# Patient Record
Sex: FEMALE | Race: WHITE | NOT HISPANIC OR LATINO | Employment: FULL TIME | URBAN - METROPOLITAN AREA
[De-identification: names, ages, dates, MRNs, and addresses within clinical notes are randomized per-mention and may not be internally consistent; named-entity substitution may affect disease eponyms.]

---

## 2017-09-16 ENCOUNTER — OFFICE VISIT (OUTPATIENT)
Dept: URGENT CARE | Facility: PHYSICIAN GROUP | Age: 32
End: 2017-09-16
Payer: COMMERCIAL

## 2017-09-16 VITALS
BODY MASS INDEX: 27.66 KG/M2 | WEIGHT: 166 LBS | SYSTOLIC BLOOD PRESSURE: 120 MMHG | DIASTOLIC BLOOD PRESSURE: 78 MMHG | RESPIRATION RATE: 16 BRPM | HEIGHT: 65 IN | OXYGEN SATURATION: 97 % | HEART RATE: 91 BPM | TEMPERATURE: 98.6 F

## 2017-09-16 DIAGNOSIS — N64.4 BREAST TENDERNESS IN FEMALE: ICD-10-CM

## 2017-09-16 PROCEDURE — 99213 OFFICE O/P EST LOW 20 MIN: CPT | Performed by: PHYSICIAN ASSISTANT

## 2017-09-16 ASSESSMENT — ENCOUNTER SYMPTOMS
RESPIRATORY NEGATIVE: 1
NEUROLOGICAL NEGATIVE: 1
MUSCULOSKELETAL NEGATIVE: 1
CONSTITUTIONAL NEGATIVE: 1
CARDIOVASCULAR NEGATIVE: 1

## 2017-09-16 NOTE — PROGRESS NOTES
"Subjective:      Donovan Shrestha is a 31 y.o. female who presents with Breast Mass (R. side starting last night Pt. states that the lump is close towards her armpit AND IT IS PAINFUL FOR HER. )        HPI   Patient presents today for noticed painful possible lump in the right breast tissue near her armpit.  She states she went to stretch yesterday and felt a soreness as she stretched her arms and then felt around and had her mom feel around and they both thought there was a lump.  Patient states it is  today.  She states she is admittedly stressed about a worst case scenario such as breast cancer.  Her aunt and grandmother have both had breast cancer, she believes her aunt did at age 31.  No first degree relatives.   No nipple discharge.  No skin changes.  No redness.  No fevers or chills.  LMP 4 days ago.  She does smoke.     Review of Systems   Constitutional: Negative.    Respiratory: Negative.    Cardiovascular: Negative.    Musculoskeletal: Negative.    Skin: Negative.    Neurological: Negative.    Endo/Heme/Allergies: Negative.        PMH:  has no past medical history on file.  MEDS:   Current Outpatient Prescriptions:   •  ibuprofen (MOTRIN) 800 MG TABS, Take 1 Tab by mouth every 8 hours as needed., Disp: 30 Tab, Rfl: 3  ALLERGIES:   Allergies   Allergen Reactions   • Amoxicillin    • Penicillins      SURGHX:   Past Surgical History:   Procedure Laterality Date   • TONSILLECTOMY       SOCHX:  reports that she has been smoking Cigarettes.  She has been smoking about 0.25 packs per day. She has never used smokeless tobacco. She reports that she drinks alcohol. She reports that she does not use drugs.  FH: Family history was reviewed, no pertinent findings to report     Objective:     /78   Pulse 91   Temp 37 °C (98.6 °F)   Resp 16   Ht 1.651 m (5' 5\")   Wt 75.3 kg (166 lb)   SpO2 97%   BMI 27.62 kg/m²      Physical Exam   Constitutional: She is oriented to person, place, and time. She " appears well-developed and well-nourished. No distress.   Cardiovascular: Normal rate and regular rhythm.    Pulmonary/Chest: Effort normal and breath sounds normal. Right breast exhibits no inverted nipple. Breasts are symmetrical. There is no breast swelling.       Genitourinary: No breast discharge or bleeding.   Neurological: She is alert and oriented to person, place, and time.   Skin: Skin is warm and dry. No rash noted.   Psychiatric: She has a normal mood and affect. Her behavior is normal.   Vitals reviewed.          Assessment/Plan:     1. Breast tenderness in female         -exam as above.   -No discrete mass however patient is smoker, FH of breast cancer in a 2nd degree relative as young as 31.    -she will need follow up and possible imaging.  I have set her up appt for Sept 25 PCP at earliest available at this clinic as is her preference.  She declines sooner appt elsewhere.   -recommend Ibuprofen, warm compresses until then  -RTC precautions in meantime.       Supportive care, differential diagnoses, and indications for immediate follow-up discussed with patient.   Pathogenesis of diagnosis discussed including typical length and natural progression.   Instructed to return to clinic or nearest emergency department for any change in condition, further concerns, or worsening of symptoms.  Patient states understanding of the plan of care and discharge instructions.      Dianelys Mosqueda P.A.-C.

## 2017-09-25 ENCOUNTER — OFFICE VISIT (OUTPATIENT)
Dept: MEDICAL GROUP | Facility: PHYSICIAN GROUP | Age: 32
End: 2017-09-25
Payer: COMMERCIAL

## 2017-09-25 VITALS
DIASTOLIC BLOOD PRESSURE: 70 MMHG | WEIGHT: 165 LBS | OXYGEN SATURATION: 96 % | TEMPERATURE: 99.9 F | HEIGHT: 65 IN | HEART RATE: 77 BPM | SYSTOLIC BLOOD PRESSURE: 112 MMHG | BODY MASS INDEX: 27.49 KG/M2

## 2017-09-25 DIAGNOSIS — N63.10 LUMP OF RIGHT BREAST: ICD-10-CM

## 2017-09-25 PROCEDURE — 99214 OFFICE O/P EST MOD 30 MIN: CPT | Performed by: PHYSICIAN ASSISTANT

## 2017-09-25 ASSESSMENT — PATIENT HEALTH QUESTIONNAIRE - PHQ9: CLINICAL INTERPRETATION OF PHQ2 SCORE: 0

## 2017-09-25 NOTE — PROGRESS NOTES
"Subjective:   Donovan Shrestha is a 31 y.o. female here today for Right breast lump. She is a new patient to me and is also establishing care today.      Lump of right breast  Onset 2-3 weeks ago right after period. Initially thought it would go away, as had had painful breasts r/t periods in the past  Became more tender on 9/15 and went to . States they confirmed a lump and told her to f/u with PCP.  Pain becomes worse with activity.  No nipple discharge. Denies skin redness over lump.       Current medicines (including changes today)  Current Outpatient Prescriptions   Medication Sig Dispense Refill   • ibuprofen (MOTRIN) 800 MG TABS Take 1 Tab by mouth every 8 hours as needed. 30 Tab 3     No current facility-administered medications for this visit.      She  has no past medical history on file.    ROS  Constitutional ROS: Negative for fevers, night sweats, Positive for hot/cold flashes.  Breast ROS: No nipple discharge, No recent change in shape/color, Positive for family history of breast disease: MGM & PGM, lump/mass on the right, pain on the right, tenderness on the right.     Objective:     Blood pressure 112/70, pulse 77, temperature 37.7 °C (99.9 °F), height 1.651 m (5' 5\"), weight 74.8 kg (165 lb), last menstrual period 09/09/2017, SpO2 96 %, not currently breastfeeding. Body mass index is 27.46 kg/m².     Physical Exam:  Constitutional: Alert, no distress.  Skin: Warm, dry, good turgor, no rashes in visible areas.  Eye: Pupils are equal and round, conjunctiva clear, lids normal.  ENMT: Lips without lesions, moist mucus membranes.  Neck: No visible masses.   Breast: Breasts are normal-appearing bilaterally. No skin erythema or dimpling/peau d'orange appearance. Left breast: normal exam. Right breast: in the far upper outer quadrant in the tail of Gillespie is localized tenderness and mild tissue thickening. No firm, discrete mass palpated. There is no axillary or supraclavicular adenopathy " palpated.  Respiratory: Unlabored respiratory effort, lungs clear to auscultation, no wheezes, no rhonchi.  Cardiovascular: Normal S1, S2, no murmur, no lower extremity edema.      Assessment and Plan:   The following treatment plan was discussed    1. Lump of right breast  No discrete mass, but given tenderness and thickening of tissue, recommend ultrasound to further evaluate, especially given patient's family h/o early-onset breast cancer. Continue supportive measures for pain control.  - US-BREAST COMPLETE-RIGHT; Future      Followup: Return if symptoms worsen or fail to improve.    Jennifer Orlando P.A.-C.

## 2017-09-25 NOTE — ASSESSMENT & PLAN NOTE
Started noticing pain and tenderness in her right upper outer breast about 2-3 weeks ago. This was right after period ended. Initially thought it would go away, as had had painful breasts r/t periods in the past. Breast became more tender on 9/15 and one of her family members examined the area and thought they felt a lump. Patient went to urgent care for further evaluation. States they confirmed a lump and told her to f/u with PCP. Patient states pain becomes worse with activity. Ibuprofen/Tylenol are minimally helpful. Has been doing warm compresses. No nipple discharge. Denies skin redness over lump or any other skin changes.

## 2017-10-06 ENCOUNTER — OFFICE VISIT (OUTPATIENT)
Dept: MEDICAL GROUP | Facility: PHYSICIAN GROUP | Age: 32
End: 2017-10-06
Payer: COMMERCIAL

## 2017-10-06 VITALS
SYSTOLIC BLOOD PRESSURE: 108 MMHG | OXYGEN SATURATION: 93 % | DIASTOLIC BLOOD PRESSURE: 72 MMHG | WEIGHT: 165 LBS | BODY MASS INDEX: 27.49 KG/M2 | HEIGHT: 65 IN | HEART RATE: 82 BPM | TEMPERATURE: 99 F

## 2017-10-06 DIAGNOSIS — L98.9 SKIN LESION OF RIGHT ARM: ICD-10-CM

## 2017-10-06 DIAGNOSIS — Z30.09 BIRTH CONTROL COUNSELING: ICD-10-CM

## 2017-10-06 DIAGNOSIS — N63.10 LUMP OF RIGHT BREAST: ICD-10-CM

## 2017-10-06 DIAGNOSIS — Z00.00 ANNUAL PHYSICAL EXAM: ICD-10-CM

## 2017-10-06 PROCEDURE — 99999 PR NO CHARGE: CPT | Performed by: PHYSICIAN ASSISTANT

## 2017-10-06 PROCEDURE — 99395 PREV VISIT EST AGE 18-39: CPT | Performed by: PHYSICIAN ASSISTANT

## 2017-10-06 NOTE — ASSESSMENT & PLAN NOTE
Complains of purplish lesion on right upper arm. First noticed ~2.5 years ago. Went to dermatologist 2 years ago and was told nothing to worry about, but it continues to increase in size. Denies pain, itching. Color is irregular but has remained consistent.

## 2017-10-06 NOTE — PROGRESS NOTES
Chief Complaint: Annual    Donovan Shrestha is a 31 y.o. Female established patient who presents for annual exam. She would like to discuss birth control today.    Pt has GYN provider: no  Last colonoscopy: N/A  Bone density test: N\A  Last Td: unknown  Regular exercise: not asked     She has the following current medical problems:    Birth control counseling  Patient states that she would like to start on a birth control method. She has never used birth control in the past so knows nothing about the available options. She has one child.     Lump of right breast  Patient states she still notices the lump. Has upcoming appointment for breast US.    Skin lesion of right arm  On ROS questioning, patient c/o of purplish lesion on right upper arm that she's concerned about. First noticed ~2.5 years ago. Went to dermatologist 2 years ago and was told nothing to worry about, but it continues to increase in size. Denies pain, itching. Color is irregular but has remained consistent.      She  has no past medical history on file.  She  has a past surgical history that includes tonsillectomy.  Current Outpatient Prescriptions   Medication Sig Dispense Refill   • Norethin-Eth Estrad-Fe Biphas (LO LOESTRIN FE) 1 MG-10 MCG / 10 MCG Tab Take 1 Tab by mouth every day. 28 Tab 11   • ibuprofen (MOTRIN) 800 MG TABS Take 1 Tab by mouth every 8 hours as needed. 30 Tab 3     No current facility-administered medications for this visit.      Social History   Substance Use Topics   • Smoking status: Current Every Day Smoker     Packs/day: 0.25     Years: 10.00     Types: Cigarettes   • Smokeless tobacco: Never Used      Comment: currently smoking about 2 cigs/day   • Alcohol use Yes      Comment: occasional      Review Of Systems  Skin: positive for changing mole, see HPI.   Eyes: negative for visual blurring, double vision, eye pain  Ears/Nose/Throat: negative for oral or dental problem - sees dentist routinely  Respiratory: negative for  "dyspnea  Cardiovascular: negative for chest pain or pressure   Breast: positive for breast mass, see HPI.  Gastrointestinal: negative for nausea, abdominal pain, constipation or diarrhea, black stool or bloody stool  Genitourinary: negative for dysuria, frequency, hematuria  Hematologic/Lymphatic/Immunologic: negative for HIV risk factors  Endocrine: negative for polyuria.       PHYSICAL EXAMINATION:  Blood pressure 108/72, pulse 82, temperature 37.2 °C (99 °F), height 1.651 m (5' 5\"), weight 74.8 kg (165 lb), last menstrual period 10/06/2017, SpO2 93 %.  Body mass index is 27.46 kg/m².  Wt Readings from Last 4 Encounters:   10/06/17 74.8 kg (165 lb)   09/25/17 74.8 kg (165 lb)   09/16/17 75.3 kg (166 lb)   06/26/16 68 kg (150 lb)       Constitutional: Alert, no distress.  Skin: Warm, dry, good turgor, no rashes. There is an ~3 mm round, raised papular lesion on the right upper arm. Regular borders but irregularly-colored (purple-gray and flesh-colored).   Eye: pupils equal, round and reactive to light, conjunctivae clear, lids normal.  ENMT: Lips without lesions, good dentition, oropharynx clear. Pinnae skin normal with no lesions. TM pearly gray with normal light reflex.   Neck: supple, no masses. No submandibular, anterior cervical, or supraclavicular adenopathy. No thyromegaly.  Respiratory: Unlabored respiratory effort, lungs clear to auscultation, no wheezes, no ronchi.  Cardiovascular: Normal S1, S2, no murmur, no peripheral edema.  Abdomen: Normoactive bowel sounds. Abdomen Soft, non-tender, no masses, no hepatosplenomegaly. No CVAT.  Psych: Alert and oriented x3, normal affect and mood.  Musc/Skel: Normal motion UE and LE proximal and distal.        ASSESSMENT/PLAN:    1. Annual physical exam  HCM:  Pap deferred today given that patient just started her menstrual period. She will re-schedule. Discussed importance of quitting smoking.   Pt counseled about skin care.    2. Birth control counseling  Patient " counseled on all of the available birth control options and most common side effects associated with each. She opts for the OCP. Patient was warned about increased risk of blood clots given that she is still smoking, albeit not very much. She understands these risks and states she is planning to quit completely in the near future. Will prescribe OCP with low estrogen component.   - Norethin-Eth Estrad-Fe Biphas (LO LOESTRIN FE) 1 MG-10 MCG / 10 MCG Tab; Take 1 Tab by mouth every day.  Dispense: 28 Tab; Refill: 11    3. Skin lesion of right arm  Given increasing size and irregular coloration, recommend biopsy to rule out malignancy. Patient advised to schedule separate appointment for this.    4. Lump of right breast  Should f/u for breast imaging as scheduled.      Jennifer Orlando P.A.-C.

## 2017-10-06 NOTE — PATIENT INSTRUCTIONS
-Schedule appointment for pap smear  -Schedule appointment for skin biopsy     Ethinyl Estradiol; Norethindrone Acetate tablets (contraception)  What is this medicine?  ETHINYL ESTRADIOL; NORETHINDRONE ACETATE (ETH in il es tra DYE ole; nor eth IN drone AS e jonas) is an oral contraceptive. The products combine two types of female hormones, an estrogen and a progestin. They are used to prevent ovulation and pregnancy. Some products are also used to treat acne in females.  This medicine may be used for other purposes; ask your health care provider or pharmacist if you have questions.  COMMON BRAND NAME(S): Estrostep Fe, Gildess Fe 1.5/30, Gildess Fe 1/20, Gildess, Junel 1.5/30, Junel 1/20, Junel Fe 1.5/30, Junel Fe 1/20, Robel Fe, ROBEL, Lo Loestrin Fe, Loestrin 1.5/30, Loestrin 1/20, Loestrin 24 Fe, Loestrin FE 1.5/30, Loestrin FE 1/20, Lomedia 24 Fe, Microgestin 1.5/30, Microgestin 1/20, Microgestin Fe 1.5/30, Microgestin Fe 1/20, Tilia Fe, Tri-Legest Fe  What should I tell my health care provider before I take this medicine?  They need to know if you have or ever had any of these conditions:  -abnormal vaginal bleeding  -blood vessel disease or blood clots  -breast, cervical, endometrial, ovarian, liver, or uterine cancer  -diabetes  -gallbladder disease  -heart disease or recent heart attack  -high blood pressure  -high cholesterol  -kidney disease  -liver disease  -migraine headaches  -stroke  -systemic lupus erythematosus (SLE)  -tobacco smoker  -an unusual or allergic reaction to estrogens, progestins, other medicines, foods, dyes, or preservatives  -pregnant or trying to get pregnant  -breast-feeding  How should I use this medicine?  Take this medicine by mouth. To reduce nausea, this medicine may be taken with food. Follow the directions on the prescription label. Take this medicine at the same time each day and in the order directed on the package. Do not take your medicine more often than directed.  Contact  your pediatrician regarding the use of this medicine in children. Special care may be needed. This medicine has been used in female children who have started having menstrual periods.  A patient package insert for the product will be given with each prescription and refill. Read this sheet carefully each time. The sheet may change frequently.  Overdosage: If you think you have taken too much of this medicine contact a poison control center or emergency room at once.  NOTE: This medicine is only for you. Do not share this medicine with others.  What if I miss a dose?  If you miss a dose, refer to the patient information sheet you received with your medicine for direction. If you miss more than one pill, this medicine may not be as effective and you may need to use another form of birth control.  What may interact with this medicine?  -acetaminophen  -antibiotics or medicines for infections, especially rifampin, rifabutin, rifapentine, and griseofulvin, and possibly penicillins or tetracyclines  -aprepitant  -ascorbic acid (vitamin C)  -atorvastatin  -barbiturate medicines, such as phenobarbital  -bosentan  -carbamazepine  -caffeine  -clofibrate  -cyclosporine  -dantrolene  -doxercalciferol  -felbamate  -grapefruit juice  -hydrocortisone  -medicines for anxiety or sleeping problems, such as diazepam or temazepam  -medicines for diabetes, including pioglitazone  -mineral oil  -modafinil  -mycophenolate  -nefazodone  -oxcarbazepine  -phenytoin  -prednisolone  -ritonavir or other medicines for HIV infection or AIDS  -rosuvastatin  -selegiline  -soy isoflavones supplements  -Kellee's wort  -tamoxifen or raloxifene  -theophylline  -thyroid hormones  -topiramate  -warfarin  This list may not describe all possible interactions. Give your health care provider a list of all the medicines, herbs, non-prescription drugs, or dietary supplements you use. Also tell them if you smoke, drink alcohol, or use illegal drugs. Some  items may interact with your medicine.  What should I watch for while using this medicine?  Visit your doctor or health care professional for regular checks on your progress. You will need a regular breast and pelvic exam and Pap smear while on this medicine.  Use an additional method of contraception during the first cycle that you take these tablets.  If you have any reason to think you are pregnant, stop taking this medicine right away and contact your doctor or health care professional.  If you are taking this medicine for hormone related problems, it may take several cycles of use to see improvement in your condition.  Smoking increases the risk of getting a blood clot or having a stroke while you are taking birth control pills, especially if you are more than 35 years old. You are strongly advised not to smoke.  This medicine can make your body retain fluid, making your fingers, hands, or ankles swell. Your blood pressure can go up. Contact your doctor or health care professional if you feel you are retaining fluid.  This medicine can make you more sensitive to the sun. Keep out of the sun. If you cannot avoid being in the sun, wear protective clothing and use sunscreen. Do not use sun lamps or tanning beds/booths.  If you wear contact lenses and notice visual changes, or if the lenses begin to feel uncomfortable, consult your eye care specialist.  In some women, tenderness, swelling, or minor bleeding of the gums may occur. Notify your dentist if this happens. Brushing and flossing your teeth regularly may help limit this. See your dentist regularly and inform your dentist of the medicines you are taking.  If you are going to have elective surgery, you may need to stop taking this medicine before the surgery. Consult your health care professional for advice.  This medicine does not protect you against HIV infection (AIDS) or any other sexually transmitted diseases.  What side effects may I notice from  receiving this medicine?  Side effects that you should report to your doctor or health care professional as soon as possible:  -breast tissue changes or discharge  -changes in vaginal bleeding during your period or between your periods  -chest pain  -coughing up blood  -dizziness or fainting spells  -headaches or migraines  -leg, arm or groin pain  -severe or sudden headaches  -stomach pain (severe)  -sudden shortness of breath  -sudden loss of coordination, especially on one side of the body  -speech problems  -symptoms of vaginal infection like itching, irritation or unusual discharge  -tenderness in the upper abdomen  -vomiting  -weakness or numbness in the arms or legs, especially on one side of the body  -yellowing of the eyes or skin  Side effects that usually do not require medical attention (report to your doctor or health care professional if they continue or are bothersome):  -breakthrough bleeding and spotting that continues beyond the 3 initial cycles of pills  -breast tenderness  -mood changes, anxiety, depression, frustration, anger, or emotional outbursts  -increased sensitivity to sun or ultraviolet light  -nausea  -skin rash, acne, or brown spots on the skin  -weight gain (slight)  This list may not describe all possible side effects. Call your doctor for medical advice about side effects. You may report side effects to FDA at 9-053-FDA-6143.  Where should I keep my medicine?  Keep out of the reach of children.  Store at room temperature between 15 and 30 degrees C (59 and 86 degrees F). Throw away any unused medicine after the expiration date.  NOTE: This sheet is a summary. It may not cover all possible information. If you have questions about this medicine, talk to your doctor, pharmacist, or health care provider.  © 2014, [employer]. (12/3/2009 1:37:14 PM)

## 2017-10-06 NOTE — ASSESSMENT & PLAN NOTE
Patient states that she would like to start on a birth control method. She has never used birth control in the past so knows nothing about the available options. She has one child.

## 2017-10-16 ENCOUNTER — HOSPITAL ENCOUNTER (OUTPATIENT)
Dept: RADIOLOGY | Facility: MEDICAL CENTER | Age: 32
End: 2017-10-16
Attending: PHYSICIAN ASSISTANT
Payer: COMMERCIAL

## 2017-10-16 DIAGNOSIS — N63.10 LUMP OF RIGHT BREAST: ICD-10-CM

## 2017-10-16 DIAGNOSIS — N63.0 BREAST LUMP: ICD-10-CM

## 2017-10-16 PROCEDURE — 76642 ULTRASOUND BREAST LIMITED: CPT | Mod: RT

## 2017-10-17 ENCOUNTER — HOSPITAL ENCOUNTER (OUTPATIENT)
Facility: MEDICAL CENTER | Age: 32
End: 2017-10-17
Attending: PHYSICIAN ASSISTANT
Payer: COMMERCIAL

## 2017-10-17 ENCOUNTER — OFFICE VISIT (OUTPATIENT)
Dept: MEDICAL GROUP | Facility: PHYSICIAN GROUP | Age: 32
End: 2017-10-17
Payer: COMMERCIAL

## 2017-10-17 VITALS
WEIGHT: 165 LBS | BODY MASS INDEX: 27.49 KG/M2 | SYSTOLIC BLOOD PRESSURE: 102 MMHG | OXYGEN SATURATION: 92 % | HEIGHT: 65 IN | HEART RATE: 76 BPM | DIASTOLIC BLOOD PRESSURE: 78 MMHG | TEMPERATURE: 97.2 F

## 2017-10-17 DIAGNOSIS — D48.5 NEOPLASM OF UNCERTAIN BEHAVIOR OF SKIN OF UPPER ARM: ICD-10-CM

## 2017-10-17 PROCEDURE — 88305 TISSUE EXAM BY PATHOLOGIST: CPT

## 2017-10-17 PROCEDURE — 11100 PR BIOPSY OF SKIN LESION: CPT | Performed by: PHYSICIAN ASSISTANT

## 2017-10-17 NOTE — PROGRESS NOTES
PROCEDURE NOTE, Shave Biopsy  Indication: Enlarging skin lesion right upper arm      History of allergy to iodine: no     The risks (including bleeding and infection) and benefits of the procedure and written informed consent obtained.    Local anesthesia was performed with    Lidocaine 2% without epinephrine, prepped with povidone iodine, and draped in the usual sterile fashion.  Shave biopsy was obtained using a Dermablade.    The site was cauterized with a Silver Nitrate stick.  The specimen was sent for pathologic examination.     The patient tolerated the procedure well and without apparent complications.     The patient was instructed to keep the wound dry and covered until bleeding subsides. Warning signs of infection were reviewed.    Jennifer Orlando P.A.-C.

## 2017-10-30 ENCOUNTER — HOSPITAL ENCOUNTER (OUTPATIENT)
Facility: MEDICAL CENTER | Age: 32
End: 2017-10-30
Attending: PHYSICIAN ASSISTANT
Payer: COMMERCIAL

## 2017-10-30 ENCOUNTER — OFFICE VISIT (OUTPATIENT)
Dept: MEDICAL GROUP | Facility: PHYSICIAN GROUP | Age: 32
End: 2017-10-30
Payer: COMMERCIAL

## 2017-10-30 VITALS
BODY MASS INDEX: 27.49 KG/M2 | WEIGHT: 165 LBS | OXYGEN SATURATION: 95 % | SYSTOLIC BLOOD PRESSURE: 112 MMHG | DIASTOLIC BLOOD PRESSURE: 70 MMHG | HEART RATE: 70 BPM | HEIGHT: 65 IN | TEMPERATURE: 98.8 F

## 2017-10-30 DIAGNOSIS — Z01.419 WELL WOMAN EXAM WITH ROUTINE GYNECOLOGICAL EXAM: ICD-10-CM

## 2017-10-30 DIAGNOSIS — Z78.9 USES BIRTH CONTROL: ICD-10-CM

## 2017-10-30 PROBLEM — N63.10 LUMP OF RIGHT BREAST: Status: RESOLVED | Noted: 2017-09-25 | Resolved: 2017-10-30

## 2017-10-30 PROBLEM — L98.9 SKIN LESION OF RIGHT ARM: Status: RESOLVED | Noted: 2017-10-06 | Resolved: 2017-10-30

## 2017-10-30 PROCEDURE — 87591 N.GONORRHOEAE DNA AMP PROB: CPT

## 2017-10-30 PROCEDURE — 99395 PREV VISIT EST AGE 18-39: CPT | Performed by: PHYSICIAN ASSISTANT

## 2017-10-30 PROCEDURE — 88175 CYTOPATH C/V AUTO FLUID REDO: CPT

## 2017-10-30 PROCEDURE — 87491 CHLMYD TRACH DNA AMP PROBE: CPT

## 2017-10-30 NOTE — PROGRESS NOTES
"Chief Complaint: Pap smear    Donovan Shrestha is a 31 y.o. female who presents for annual gynecologic exam/pap smear    Pt has GYN provider: no  Last pap smear: Believes ~2 years ago - unsure of exact date  Denies history of previous abnormal pap  LMP: 10/6/17    Current Problems:    Uses birth control  Continues to take Lo-Loestrin Fe. Denies concerns.    She  has no past medical history on file.  She  has a past surgical history that includes tonsillectomy.  Current Outpatient Prescriptions   Medication Sig Dispense Refill   • Norethin-Eth Estrad-Fe Biphas (LO LOESTRIN FE) 1 MG-10 MCG / 10 MCG Tab Take 1 Tab by mouth every day. 28 Tab 11   • ibuprofen (MOTRIN) 800 MG TABS Take 1 Tab by mouth every 8 hours as needed. 30 Tab 3     No current facility-administered medications for this visit.      Social History   Substance Use Topics   • Smoking status: Current Every Day Smoker     Packs/day: 0.25     Years: 10.00     Types: Cigarettes   • Smokeless tobacco: Never Used      Comment: currently smoking about 2 cigs/day   • Alcohol use Yes      Comment: occasional        Review Of Systems  Breast: Denies breast tenderness, mass. States her previous breast symptoms have completely resolved.  Genitourinary: negative for abnormal vaginal discharge, pelvic pain, abnormal vaginal bleeding        PHYSICAL EXAMINATION:  Blood pressure 112/70, pulse 70, temperature 37.1 °C (98.8 °F), height 1.651 m (5' 5\"), weight 74.8 kg (165 lb), last menstrual period 10/06/2017, SpO2 95 %, not currently breastfeeding.  Body mass index is 27.46 kg/m².  Wt Readings from Last 4 Encounters:   10/30/17 74.8 kg (165 lb)   10/17/17 74.8 kg (165 lb)   10/06/17 74.8 kg (165 lb)   09/25/17 74.8 kg (165 lb)       Constitutional: Alert, no distress.  Skin: Warm, dry, good turgor, no rashes or suspicious lesions in visible areas.  Eye: pupils equal, round, conjunctivae clear, lids normal.  ENMT: Lips without lesions.  Breast: Deferred - performed " 9/2017  Pelvic: Normal-appearing external female genitalia. No rash, erythema, edema, or lesions appreciated. On speculum insertion, vaginal walls have normal, rugated appearance. No abnormal discharge is seen. Cervix is easily visualized. Os is multiparous. Some minor friability appreciated with pap collection.      ASSESSMENT/PLAN:    1. Well woman exam with routine gynecological exam  - THINPREP RFLX HPV ASCUS W/CTNG; Future    2. Uses birth control  Continue Lo Loestrin Fe.      Return in about 1 year (around 10/30/2018) for Annual; Short.    Jennifer Orlando P.A.-C.

## 2017-11-02 LAB
C TRACH DNA GENITAL QL NAA+PROBE: NEGATIVE
CYTOLOGY REG CYTOL: NORMAL
N GONORRHOEA DNA GENITAL QL NAA+PROBE: NEGATIVE
SPECIMEN SOURCE: NORMAL

## 2018-06-20 ENCOUNTER — OFFICE VISIT (OUTPATIENT)
Dept: URGENT CARE | Facility: PHYSICIAN GROUP | Age: 33
End: 2018-06-20
Payer: COMMERCIAL

## 2018-06-20 VITALS
BODY MASS INDEX: 29.76 KG/M2 | WEIGHT: 178.6 LBS | OXYGEN SATURATION: 96 % | HEIGHT: 65 IN | DIASTOLIC BLOOD PRESSURE: 80 MMHG | SYSTOLIC BLOOD PRESSURE: 110 MMHG | TEMPERATURE: 98.8 F | HEART RATE: 74 BPM

## 2018-06-20 DIAGNOSIS — H00.021 HORDEOLUM INTERNUM OF RIGHT UPPER EYELID: ICD-10-CM

## 2018-06-20 PROCEDURE — 99214 OFFICE O/P EST MOD 30 MIN: CPT | Performed by: PHYSICIAN ASSISTANT

## 2018-06-20 RX ORDER — POLYMYXIN B SULFATE AND TRIMETHOPRIM 1; 10000 MG/ML; [USP'U]/ML
1 SOLUTION OPHTHALMIC EVERY 4 HOURS
Qty: 10 ML | Refills: 0 | Status: SHIPPED | OUTPATIENT
Start: 2018-06-20 | End: 2018-06-27

## 2018-06-20 ASSESSMENT — ENCOUNTER SYMPTOMS
DOUBLE VISION: 0
BLURRED VISION: 0
FOREIGN BODY SENSATION: 0
EYE REDNESS: 0
PHOTOPHOBIA: 0
EYE DISCHARGE: 1

## 2018-06-20 NOTE — PROGRESS NOTES
"Subjective:      Donovan Shrestha is a 32 y.o. female who presents with Eye Problem (R eye swollen, pt woke up with swollen/shut eye, sore, x2 days )    PMH:  Reviewed with patient/family member/EPIC.   MEDS:   Current Outpatient Prescriptions:   •  Norethin-Eth Estrad-Fe Biphas (LO LOESTRIN FE) 1 MG-10 MCG / 10 MCG Tab, Take 1 Tab by mouth every day., Disp: 28 Tab, Rfl: 11  •  ibuprofen (MOTRIN) 800 MG TABS, Take 1 Tab by mouth every 8 hours as needed., Disp: 30 Tab, Rfl: 3  ALLERGIES:   Allergies   Allergen Reactions   • Amoxicillin    • Penicillins      SURGHX:   Past Surgical History:   Procedure Laterality Date   • TONSILLECTOMY       SOCHX:  reports that she has been smoking Cigarettes.  She has a 2.50 pack-year smoking history. She has never used smokeless tobacco. She reports that she drinks alcohol. She reports that she does not use drugs.  FH: Reviewed with patient/family. Not pertinent to this complaint.          Patient presents with:  Eye Problem: R eye swollen, pt woke up with swollen/shut eye, sore, x2 days , upper eyelid feels sore on lateral corner          Eye Problem    The right eye is affected. This is a new problem. The current episode started yesterday. The problem occurs constantly. The problem has been gradually worsening. There was no injury mechanism. The pain is at a severity of 4/10. There is no known exposure to pink eye. She does not wear contacts. Associated symptoms include an eye discharge. Pertinent negatives include no blurred vision, double vision, eye redness, foreign body sensation or photophobia. Treatments tried: warm compress. The treatment provided no relief.       Review of Systems   Eyes: Positive for discharge. Negative for blurred vision, double vision, photophobia and redness.   All other systems reviewed and are negative.         Objective:     /80   Pulse 74   Temp 37.1 °C (98.8 °F)   Ht 1.651 m (5' 5\")   Wt 81 kg (178 lb 9.6 oz)   SpO2 96%   BMI 29.72 kg/m²  "     Physical Exam   Constitutional: She is oriented to person, place, and time. She appears well-developed and well-nourished. No distress.   HENT:   Head: Normocephalic and atraumatic.   Nose: Nose normal.   Eyes: EOM are normal. Pupils are equal, round, and reactive to light. Lids are everted and swept, no foreign bodies found. Right eye exhibits discharge and hordeolum.       Neck: Normal range of motion. Neck supple.   Cardiovascular: Normal rate, regular rhythm and normal heart sounds.    Pulmonary/Chest: Effort normal and breath sounds normal.   Musculoskeletal: Normal range of motion.   Lymphadenopathy:     She has no cervical adenopathy.   Neurological: She is alert and oriented to person, place, and time.   Skin: Skin is warm and dry.   Psychiatric: She has a normal mood and affect.   Nursing note and vitals reviewed.              Assessment/Plan:     1. Hordeolum internum of right upper eyelid  polymixin-trimethoprim (POLYTRIM) 15930-7.1 UNIT/ML-% Solution     PT can use warm compress on affected area for relief of symptoms.     Motrin/Advil/Ibuprophen 600 mg every 6 hours as needed for pain or fever.    PT should follow up with PCP in 1-2 days for re-evaluation if symptoms have not improved.  Discussed red flags and reasons to return to UC or ED.  Pt and/or family verbalized understanding of diagnosis and follow up instructions and was offered informational handout on diagnosis.  PT discharged.

## 2018-06-20 NOTE — LETTER
June 20, 2018         Patient: Donovan Shrestha   YOB: 1985   Date of Visit: 6/20/2018           To Whom it May Concern:    Donovan Shrestha was seen in my clinic on 6/20/2018. She may return to work today without restrictions.    If you have any questions or concerns, please don't hesitate to call.        Sincerely,           Brigid Fonseca P.A.-C.  Electronically Signed

## 2018-06-20 NOTE — PATIENT INSTRUCTIONS
Stye  A stye is a bump on your eyelid caused by a bacterial infection. A stye can form inside the eyelid (internal stye) or outside the eyelid (external stye). An internal stye may be caused by an infected oil-producing gland inside your eyelid. An external stye may be caused by an infection at the base of your eyelash (hair follicle).  Styes are very common. Anyone can get them at any age. They usually occur in just one eye, but you may have more than one in either eye.  What are the causes?  The infection is almost always caused by bacteria called Staphylococcus aureus. This is a common type of bacteria that lives on your skin.  What increases the risk?  You may be at higher risk for a stye if you have had one before. You may also be at higher risk if you have:  · Diabetes.  · Long-term illness.  · Long-term eye redness.  · A skin condition called seborrhea.  · High fat levels in your blood (lipids).  What are the signs or symptoms?  Eyelid pain is the most common symptom of a stye. Internal styes are more painful than external styes. Other signs and symptoms may include:  · Painful swelling of your eyelid.  · A scratchy feeling in your eye.  · Tearing and redness of your eye.  · Pus draining from the stye.  How is this diagnosed?  Your health care provider may be able to diagnose a stye just by examining your eye. The health care provider may also check to make sure:  · You do not have a fever or other signs of a more serious infection.  · The infection has not spread to other parts of your eye or areas around your eye.  How is this treated?  Most styes will clear up in a few days without treatment. In some cases, you may need to use antibiotic drops or ointment to prevent infection. Your health care provider may have to drain the stye surgically if your stye is:  · Large.  · Causing a lot of pain.  · Interfering with your vision.  This can be done using a thin blade or a needle.  Follow these instructions at  home:  · Take medicines only as directed by your health care provider.  · Apply a clean, warm compress to your eye for 10 minutes, 4 times a day.  · Do not wear contact lenses or eye makeup until your stye has healed.  · Do not try to pop or drain the stye.  Contact a health care provider if:  · You have chills or a fever.  · Your stye does not go away after several days.  · Your stye affects your vision.  · Your eyeball becomes swollen, red, or painful.  This information is not intended to replace advice given to you by your health care provider. Make sure you discuss any questions you have with your health care provider.  Document Released: 09/27/2006 Document Revised: 08/13/2017 Document Reviewed: 04/03/2015  Elsevier Interactive Patient Education © 2017 [employer]

## 2018-10-18 ENCOUNTER — HOSPITAL ENCOUNTER (EMERGENCY)
Facility: MEDICAL CENTER | Age: 33
End: 2018-10-18
Attending: EMERGENCY MEDICINE
Payer: COMMERCIAL

## 2018-10-18 ENCOUNTER — APPOINTMENT (OUTPATIENT)
Dept: RADIOLOGY | Facility: MEDICAL CENTER | Age: 33
End: 2018-10-18
Attending: EMERGENCY MEDICINE
Payer: COMMERCIAL

## 2018-10-18 VITALS
BODY MASS INDEX: 29.38 KG/M2 | SYSTOLIC BLOOD PRESSURE: 121 MMHG | RESPIRATION RATE: 20 BRPM | DIASTOLIC BLOOD PRESSURE: 79 MMHG | TEMPERATURE: 97.6 F | WEIGHT: 176.37 LBS | HEART RATE: 67 BPM | OXYGEN SATURATION: 97 % | HEIGHT: 65 IN

## 2018-10-18 DIAGNOSIS — R07.89 CHEST WALL PAIN: ICD-10-CM

## 2018-10-18 LAB
ALBUMIN SERPL BCP-MCNC: 4.3 G/DL (ref 3.2–4.9)
ALBUMIN/GLOB SERPL: 1.6 G/DL
ALP SERPL-CCNC: 68 U/L (ref 30–99)
ALT SERPL-CCNC: 18 U/L (ref 2–50)
ANION GAP SERPL CALC-SCNC: 8 MMOL/L (ref 0–11.9)
APTT PPP: 25.1 SEC (ref 24.7–36)
AST SERPL-CCNC: 17 U/L (ref 12–45)
BASOPHILS # BLD AUTO: 0.8 % (ref 0–1.8)
BASOPHILS # BLD: 0.09 K/UL (ref 0–0.12)
BILIRUB SERPL-MCNC: 0.3 MG/DL (ref 0.1–1.5)
BNP SERPL-MCNC: 77 PG/ML (ref 0–100)
BUN SERPL-MCNC: 16 MG/DL (ref 8–22)
CALCIUM SERPL-MCNC: 8.6 MG/DL (ref 8.4–10.2)
CHLORIDE SERPL-SCNC: 105 MMOL/L (ref 96–112)
CO2 SERPL-SCNC: 21 MMOL/L (ref 20–33)
CREAT SERPL-MCNC: 0.72 MG/DL (ref 0.5–1.4)
EKG IMPRESSION: NORMAL
EOSINOPHIL # BLD AUTO: 0.58 K/UL (ref 0–0.51)
EOSINOPHIL NFR BLD: 5.3 % (ref 0–6.9)
ERYTHROCYTE [DISTWIDTH] IN BLOOD BY AUTOMATED COUNT: 40.1 FL (ref 35.9–50)
GLOBULIN SER CALC-MCNC: 2.7 G/DL (ref 1.9–3.5)
GLUCOSE SERPL-MCNC: 108 MG/DL (ref 65–99)
HCT VFR BLD AUTO: 40.5 % (ref 37–47)
HGB BLD-MCNC: 14.3 G/DL (ref 12–16)
IMM GRANULOCYTES # BLD AUTO: 0.05 K/UL (ref 0–0.11)
IMM GRANULOCYTES NFR BLD AUTO: 0.5 % (ref 0–0.9)
INR PPP: 0.97 (ref 0.87–1.13)
LIPASE SERPL-CCNC: 25 U/L (ref 7–58)
LYMPHOCYTES # BLD AUTO: 3.29 K/UL (ref 1–4.8)
LYMPHOCYTES NFR BLD: 29.9 % (ref 22–41)
MCH RBC QN AUTO: 31.8 PG (ref 27–33)
MCHC RBC AUTO-ENTMCNC: 35.3 G/DL (ref 33.6–35)
MCV RBC AUTO: 90 FL (ref 81.4–97.8)
MONOCYTES # BLD AUTO: 0.84 K/UL (ref 0–0.85)
MONOCYTES NFR BLD AUTO: 7.6 % (ref 0–13.4)
NEUTROPHILS # BLD AUTO: 6.17 K/UL (ref 2–7.15)
NEUTROPHILS NFR BLD: 55.9 % (ref 44–72)
NRBC # BLD AUTO: 0 K/UL
NRBC BLD-RTO: 0 /100 WBC
PLATELET # BLD AUTO: 280 K/UL (ref 164–446)
PMV BLD AUTO: 9.3 FL (ref 9–12.9)
POTASSIUM SERPL-SCNC: 3.7 MMOL/L (ref 3.6–5.5)
PROT SERPL-MCNC: 7 G/DL (ref 6–8.2)
PROTHROMBIN TIME: 12.8 SEC (ref 12–14.6)
RBC # BLD AUTO: 4.5 M/UL (ref 4.2–5.4)
SODIUM SERPL-SCNC: 134 MMOL/L (ref 135–145)
TROPONIN I SERPL-MCNC: <0.02 NG/ML (ref 0–0.04)
WBC # BLD AUTO: 11 K/UL (ref 4.8–10.8)

## 2018-10-18 PROCEDURE — 80053 COMPREHEN METABOLIC PANEL: CPT

## 2018-10-18 PROCEDURE — 83690 ASSAY OF LIPASE: CPT

## 2018-10-18 PROCEDURE — 700111 HCHG RX REV CODE 636 W/ 250 OVERRIDE (IP): Performed by: EMERGENCY MEDICINE

## 2018-10-18 PROCEDURE — 93005 ELECTROCARDIOGRAM TRACING: CPT | Performed by: EMERGENCY MEDICINE

## 2018-10-18 PROCEDURE — 93005 ELECTROCARDIOGRAM TRACING: CPT

## 2018-10-18 PROCEDURE — 85730 THROMBOPLASTIN TIME PARTIAL: CPT

## 2018-10-18 PROCEDURE — 83880 ASSAY OF NATRIURETIC PEPTIDE: CPT

## 2018-10-18 PROCEDURE — 71045 X-RAY EXAM CHEST 1 VIEW: CPT

## 2018-10-18 PROCEDURE — 96374 THER/PROPH/DIAG INJ IV PUSH: CPT

## 2018-10-18 PROCEDURE — 85610 PROTHROMBIN TIME: CPT

## 2018-10-18 PROCEDURE — 99284 EMERGENCY DEPT VISIT MOD MDM: CPT

## 2018-10-18 PROCEDURE — 85025 COMPLETE CBC W/AUTO DIFF WBC: CPT

## 2018-10-18 PROCEDURE — 84484 ASSAY OF TROPONIN QUANT: CPT

## 2018-10-18 RX ORDER — KETOROLAC TROMETHAMINE 30 MG/ML
15 INJECTION, SOLUTION INTRAMUSCULAR; INTRAVENOUS ONCE
Status: COMPLETED | OUTPATIENT
Start: 2018-10-18 | End: 2018-10-18

## 2018-10-18 RX ORDER — NAPROXEN 500 MG/1
500 TABLET ORAL
Qty: 20 TAB | Refills: 0 | Status: SHIPPED | OUTPATIENT
Start: 2018-10-18 | End: 2018-10-23

## 2018-10-18 RX ADMIN — KETOROLAC TROMETHAMINE 15 MG: 30 INJECTION, SOLUTION INTRAMUSCULAR at 13:51

## 2018-10-18 ASSESSMENT — PAIN SCALES - GENERAL: PAINLEVEL_OUTOF10: 3

## 2018-10-18 ASSESSMENT — PAIN DESCRIPTION - DESCRIPTORS: DESCRIPTORS: ACHING

## 2018-10-18 NOTE — ED NOTES
All orders completed, blood has been sent and CXR, pt is resting with her eyes closed. Report to Karen who will now assume care. New medications for pain ordered.

## 2018-10-18 NOTE — ED TRIAGE NOTES
Pt presents complaining of acute onset of suprasternal chest pain since this AM, with associated dyspnea.  She denies any similar past episodes.

## 2018-10-18 NOTE — ED PROVIDER NOTES
ED Provider Note    ER PROVIDER NOTE        CHIEF COMPLAINT  Chief Complaint   Patient presents with   • Chest Pain       HPI  Donovan Shrestha is a 32 y.o. female who presents to the emergency department complaining of anterior chest pain and congestion.  Patient reports that she had slight cough/congestion, woke up this morning with some ear pain and then developed a rough anterior chest pain.  It seems to be worse when she coughs or takes a deep breath, it is not exertional or positional.  There is no radiation and has been fairly constant.  She denies any shortness of breath fevers or chills.  No leg pain or swelling.  No nausea vomiting abdominal pain or diaphoresis.    Has no history of diabetes, hypertension,drug use, dyslipidemia, family history of early CAD, history of CAD.    Does smoke cigarettes      REVIEW OF SYSTEMS  Pertinent positives include chest pain. Pertinent negatives include no fever. See HPI for details. All other systems reviewed and are negative.    PAST MEDICAL HISTORY   none    SURGICAL HISTORY   has a past surgical history that includes tonsillectomy.    FAMILY HISTORY  Family History   Problem Relation Age of Onset   • Cancer Father    • Prostate cancer Father    • Hypertension Brother    • Hypertension Paternal Aunt    • Hypertension Paternal Uncle    • Cancer Maternal Grandmother    • Breast Cancer Maternal Grandmother    • Cancer Maternal Grandfather         colon cancer   • Cancer Paternal Grandmother         breast cancer   • Hypertension Paternal Grandmother    • Breast Cancer Paternal Grandmother    • Hypertension Paternal Grandfather        SOCIAL HISTORY  Social History     Social History   • Marital status: Single     Spouse name: N/A   • Number of children: N/A   • Years of education: N/A     Social History Main Topics   • Smoking status: Current Every Day Smoker     Packs/day: 0.25     Years: 10.00     Types: Cigarettes   • Smokeless tobacco: Never Used      Comment: currently  "smoking about 2 cigs/day   • Alcohol use Yes      Comment: occasional    • Drug use: No   • Sexual activity: Yes     Partners: Male     Other Topics Concern   • Not on file     Social History Narrative   • No narrative on file      History   Drug Use No       CURRENT MEDICATIONS  Home Medications    **Home medications have not yet been reviewed for this encounter**         ALLERGIES  Allergies   Allergen Reactions   • Amoxicillin Unspecified     Rxn - seizure   • Penicillins Unspecified     Rxn - seizure       PHYSICAL EXAM  VITAL SIGNS: /79   Pulse 62   Temp 36.4 °C (97.6 °F)   Resp 18   Ht 1.651 m (5' 5\")   Wt 80 kg (176 lb 5.9 oz)   LMP 09/27/2018 (Approximate)   SpO2 96%   BMI 29.35 kg/m²   Pulse ox interpretation: I interpret this pulse ox as normal.    Constitutional: Alert in no apparent distress.  HENT: No signs of trauma, Bilateral external ears normal, Nose normal.   Eyes: Pupils are equal and reactive, Conjunctiva normal, Non-icteric.   Neck: Normal range of motion, No tenderness, Supple, No stridor.   Lymphatic: No lymphadenopathy noted.   Cardiovascular: Regular rate and rhythm, no murmurs.   Thorax & Lungs: Normal breath sounds, No respiratory distress, No wheezing, tenderness to palpation over anterior chest   Abdomen: Bowel sounds normal, Soft, No tenderness, No masses, No pulsatile masses. No peritoneal signs.  Skin: Warm, Dry, No erythema, No rash.   Back: No bony tenderness, No CVA tenderness.   Extremities: Intact distal pulses, No edema, No tenderness, No cyanosis, Negative Kim's sign.  Musculoskeletal: Good range of motion in all major joints. No tenderness to palpation or major deformities noted.   Neurologic: Alert , Normal motor function, Normal sensory function, No focal deficits noted.   Psychiatric: Affect normal, Judgment normal, Mood normal.     DIAGNOSTIC STUDIES / PROCEDURES    Results for orders placed or performed during the hospital encounter of 10/18/18   Troponin "   Result Value Ref Range    Troponin I <0.02 0.00 - 0.04 ng/mL   Btype Natriuretic Peptide   Result Value Ref Range    B Natriuretic Peptide 77 0 - 100 pg/mL   CBC with Differential   Result Value Ref Range    WBC 11.0 (H) 4.8 - 10.8 K/uL    RBC 4.50 4.20 - 5.40 M/uL    Hemoglobin 14.3 12.0 - 16.0 g/dL    Hematocrit 40.5 37.0 - 47.0 %    MCV 90.0 81.4 - 97.8 fL    MCH 31.8 27.0 - 33.0 pg    MCHC 35.3 (H) 33.6 - 35.0 g/dL    RDW 40.1 35.9 - 50.0 fL    Platelet Count 280 164 - 446 K/uL    MPV 9.3 9.0 - 12.9 fL    Neutrophils-Polys 55.90 44.00 - 72.00 %    Lymphocytes 29.90 22.00 - 41.00 %    Monocytes 7.60 0.00 - 13.40 %    Eosinophils 5.30 0.00 - 6.90 %    Basophils 0.80 0.00 - 1.80 %    Immature Granulocytes 0.50 0.00 - 0.90 %    Nucleated RBC 0.00 /100 WBC    Neutrophils (Absolute) 6.17 2.00 - 7.15 K/uL    Lymphs (Absolute) 3.29 1.00 - 4.80 K/uL    Monos (Absolute) 0.84 0.00 - 0.85 K/uL    Eos (Absolute) 0.58 (H) 0.00 - 0.51 K/uL    Baso (Absolute) 0.09 0.00 - 0.12 K/uL    Immature Granulocytes (abs) 0.05 0.00 - 0.11 K/uL    NRBC (Absolute) 0.00 K/uL   Complete Metabolic Panel (CMP)   Result Value Ref Range    Sodium 134 (L) 135 - 145 mmol/L    Potassium 3.7 3.6 - 5.5 mmol/L    Chloride 105 96 - 112 mmol/L    Co2 21 20 - 33 mmol/L    Anion Gap 8.0 0.0 - 11.9    Glucose 108 (H) 65 - 99 mg/dL    Bun 16 8 - 22 mg/dL    Creatinine 0.72 0.50 - 1.40 mg/dL    Calcium 8.6 8.4 - 10.2 mg/dL    AST(SGOT) 17 12 - 45 U/L    ALT(SGPT) 18 2 - 50 U/L    Alkaline Phosphatase 68 30 - 99 U/L    Total Bilirubin 0.3 0.1 - 1.5 mg/dL    Albumin 4.3 3.2 - 4.9 g/dL    Total Protein 7.0 6.0 - 8.2 g/dL    Globulin 2.7 1.9 - 3.5 g/dL    A-G Ratio 1.6 g/dL   Prothrombin Time   Result Value Ref Range    PT 12.8 12.0 - 14.6 sec    INR 0.97 0.87 - 1.13   APTT   Result Value Ref Range    APTT 25.1 24.7 - 36.0 sec   Lipase   Result Value Ref Range    Lipase 25 7 - 58 U/L   ESTIMATED GFR   Result Value Ref Range    GFR If  >60 >60  mL/min/1.73 m 2    GFR If Non African American >60 >60 mL/min/1.73 m 2   EKG   Result Value Ref Range    Report       [facility] Emergency Dept.    Test Date:  2018-10-18  Pt Name:    LUCINDA RIZVI                  Department: [facility]  MRN:        [id number]                      Room:  Gender:     Female                       Technician: [id number]  :        1985                   Requested By:ER TRIAGE PROTOCOL  Order #:    [id number]                    Reading MD: MEHDI SAENZ MD    Measurements  Intervals                                Axis  Rate:       65                           P:          70  ID:         [id number]                          QRS:        61  QRSD:       96                           T:          40  QT:         382  QTc:        398    Interpretive Statements  Sinus rhythm  Compared to ECG 2015 10:29:48  No significant changes    Electronically Signed On 10- 13:33:16 PDT by MEHDI SAENZ MD           COURSE & MEDICAL DECISION MAKING  Nursing notes, VS, PMSFHx reviewed in chart.    1:04 PM Patient seen and examined at bedside. Patient will be treated with ketorolac.  Patient had chest pain protocol orders through triage, labs, ECG, x-ray to evaluate her symptoms.     2:18 PM  Patient reevaluated, pain is improved, will plan for discharge      Decision Making:  This is a 32 y.o. female presented with anterior chest wall pain.  This does seem more inflammatory in nature I will prescribe Naprosyn. ACS is less likely given atypical nature of pain, ECG with no ischemic changes, negative troponin with several hours of symptoms, HEART score of 1 and patient lack of major cardiac risk factors. Given these factors as well as patient age, risk of 30 day to 6 month mortality or ACS is low based on multiple studies. This was discussed with patient and need for follow up was emphasized. PE is less likely given nature of pain not consistent with PE and PERC negative. Dissection,  aneurysm and pneumothorax are unlikely given the pain is resolved, as well as the nature of the pain and lack of risk factors, such as smoking, as well as Xray lacking evidence of either. Pneumonia or other infection is less likely given no fever or infectious symptoms as well as no radiographic evidence. Other non emergent causes such as costochondritis, muscle strain,GI causes were also considered    I discussed strict return precautions with pt including shortness of breath, new/persistent/worse pain, syncope, vomiting, fever, palpitations, abdominal pain or any other concerns. Pt understood these well and was instructed to follow up with PCP.        The patient will return for new or worsening symptoms and is stable at the time of discharge.    The patient is referred to a primary physician for blood pressure management, diabetic screening, and for all other preventative health concerns.      DISPOSITION:  Patient will be discharged home in stable condition.    FOLLOW UP:  Jennifer Orlando P.A.-C.  [address]  [place] [address]  [phone]    In 1 week        OUTPATIENT MEDICATIONS:  New Prescriptions    NAPROXEN (NAPROSYN) 500 MG TAB    Take 1 Tab by mouth 2 times daily with meals as needed (pain) for up to 5 days.         FINAL IMPRESSION  1. Chest wall pain         The note accurately reflects work and decisions made by me.  Tomás Peter  10/18/2018  2:20 PM

## 2019-04-29 ENCOUNTER — APPOINTMENT (OUTPATIENT)
Dept: RADIOLOGY | Facility: MEDICAL CENTER | Age: 34
DRG: 392 | End: 2019-04-29
Attending: EMERGENCY MEDICINE
Payer: COMMERCIAL

## 2019-04-29 ENCOUNTER — HOSPITAL ENCOUNTER (INPATIENT)
Facility: MEDICAL CENTER | Age: 34
LOS: 1 days | DRG: 392 | End: 2019-04-29
Attending: EMERGENCY MEDICINE | Admitting: HOSPITALIST
Payer: COMMERCIAL

## 2019-04-29 VITALS
RESPIRATION RATE: 16 BRPM | SYSTOLIC BLOOD PRESSURE: 116 MMHG | WEIGHT: 177.47 LBS | HEIGHT: 65 IN | BODY MASS INDEX: 29.57 KG/M2 | DIASTOLIC BLOOD PRESSURE: 68 MMHG | OXYGEN SATURATION: 96 % | HEART RATE: 76 BPM | TEMPERATURE: 98.8 F

## 2019-04-29 DIAGNOSIS — K56.609 SBO (SMALL BOWEL OBSTRUCTION) (HCC): ICD-10-CM

## 2019-04-29 DIAGNOSIS — R10.33 PERIUMBILICAL ABDOMINAL PAIN: ICD-10-CM

## 2019-04-29 PROBLEM — F17.200 SMOKER: Status: ACTIVE | Noted: 2019-04-29

## 2019-04-29 PROBLEM — R73.09 ELEVATED GLUCOSE: Status: ACTIVE | Noted: 2019-04-29

## 2019-04-29 PROBLEM — K52.9 GASTROENTERITIS: Status: ACTIVE | Noted: 2019-04-29

## 2019-04-29 LAB
ALBUMIN SERPL BCP-MCNC: 3.8 G/DL (ref 3.2–4.9)
ALBUMIN/GLOB SERPL: 1.4 G/DL
ALP SERPL-CCNC: 77 U/L (ref 30–99)
ALT SERPL-CCNC: 21 U/L (ref 2–50)
ANION GAP SERPL CALC-SCNC: 6 MMOL/L (ref 0–11.9)
ANION GAP SERPL CALC-SCNC: 9 MMOL/L (ref 0–11.9)
APPEARANCE UR: CLEAR
AST SERPL-CCNC: 20 U/L (ref 12–45)
BASOPHILS # BLD AUTO: 0.4 % (ref 0–1.8)
BASOPHILS # BLD: 0.04 K/UL (ref 0–0.12)
BILIRUB SERPL-MCNC: 0.6 MG/DL (ref 0.1–1.5)
BILIRUB UR QL STRIP.AUTO: NEGATIVE
BUN SERPL-MCNC: 17 MG/DL (ref 8–22)
BUN SERPL-MCNC: 24 MG/DL (ref 8–22)
CALCIUM SERPL-MCNC: 8.2 MG/DL (ref 8.4–10.2)
CALCIUM SERPL-MCNC: 8.7 MG/DL (ref 8.4–10.2)
CHLORIDE SERPL-SCNC: 105 MMOL/L (ref 96–112)
CHLORIDE SERPL-SCNC: 108 MMOL/L (ref 96–112)
CO2 SERPL-SCNC: 20 MMOL/L (ref 20–33)
CO2 SERPL-SCNC: 22 MMOL/L (ref 20–33)
COLOR UR: YELLOW
CREAT SERPL-MCNC: 0.63 MG/DL (ref 0.5–1.4)
CREAT SERPL-MCNC: 0.78 MG/DL (ref 0.5–1.4)
EOSINOPHIL # BLD AUTO: 0.25 K/UL (ref 0–0.51)
EOSINOPHIL NFR BLD: 2.4 % (ref 0–6.9)
ERYTHROCYTE [DISTWIDTH] IN BLOOD BY AUTOMATED COUNT: 39.8 FL (ref 35.9–50)
GLOBULIN SER CALC-MCNC: 2.7 G/DL (ref 1.9–3.5)
GLUCOSE SERPL-MCNC: 121 MG/DL (ref 65–99)
GLUCOSE SERPL-MCNC: 129 MG/DL (ref 65–99)
GLUCOSE UR STRIP.AUTO-MCNC: NEGATIVE MG/DL
HCG SERPL QL: NEGATIVE
HCT VFR BLD AUTO: 43.3 % (ref 37–47)
HGB BLD-MCNC: 15 G/DL (ref 12–16)
IMM GRANULOCYTES # BLD AUTO: 0.05 K/UL (ref 0–0.11)
IMM GRANULOCYTES NFR BLD AUTO: 0.5 % (ref 0–0.9)
KETONES UR STRIP.AUTO-MCNC: NEGATIVE MG/DL
LEUKOCYTE ESTERASE UR QL STRIP.AUTO: NEGATIVE
LIPASE SERPL-CCNC: 30 U/L (ref 7–58)
LYMPHOCYTES # BLD AUTO: 0.96 K/UL (ref 1–4.8)
LYMPHOCYTES NFR BLD: 9.2 % (ref 22–41)
MCH RBC QN AUTO: 31.1 PG (ref 27–33)
MCHC RBC AUTO-ENTMCNC: 34.6 G/DL (ref 33.6–35)
MCV RBC AUTO: 89.6 FL (ref 81.4–97.8)
MICRO URNS: ABNORMAL
MONOCYTES # BLD AUTO: 0.46 K/UL (ref 0–0.85)
MONOCYTES NFR BLD AUTO: 4.4 % (ref 0–13.4)
NEUTROPHILS # BLD AUTO: 8.67 K/UL (ref 2–7.15)
NEUTROPHILS NFR BLD: 83.1 % (ref 44–72)
NITRITE UR QL STRIP.AUTO: NEGATIVE
NRBC # BLD AUTO: 0 K/UL
NRBC BLD-RTO: 0 /100 WBC
PH UR STRIP.AUTO: 5.5 [PH]
PLATELET # BLD AUTO: 240 K/UL (ref 164–446)
PMV BLD AUTO: 9.3 FL (ref 9–12.9)
POTASSIUM SERPL-SCNC: 3.7 MMOL/L (ref 3.6–5.5)
POTASSIUM SERPL-SCNC: 3.9 MMOL/L (ref 3.6–5.5)
PROT SERPL-MCNC: 6.5 G/DL (ref 6–8.2)
PROT UR QL STRIP: NEGATIVE MG/DL
RBC # BLD AUTO: 4.83 M/UL (ref 4.2–5.4)
RBC UR QL AUTO: ABNORMAL
SODIUM SERPL-SCNC: 134 MMOL/L (ref 135–145)
SODIUM SERPL-SCNC: 136 MMOL/L (ref 135–145)
SP GR UR STRIP.AUTO: 1.02
WBC # BLD AUTO: 10.4 K/UL (ref 4.8–10.8)

## 2019-04-29 PROCEDURE — 80053 COMPREHEN METABOLIC PANEL: CPT

## 2019-04-29 PROCEDURE — 99235 HOSP IP/OBS SAME DATE MOD 70: CPT | Performed by: HOSPITALIST

## 2019-04-29 PROCEDURE — 304538 HCHG NG TUBE

## 2019-04-29 PROCEDURE — 700111 HCHG RX REV CODE 636 W/ 250 OVERRIDE (IP): Performed by: EMERGENCY MEDICINE

## 2019-04-29 PROCEDURE — 84703 CHORIONIC GONADOTROPIN ASSAY: CPT

## 2019-04-29 PROCEDURE — 96375 TX/PRO/DX INJ NEW DRUG ADDON: CPT

## 2019-04-29 PROCEDURE — 80048 BASIC METABOLIC PNL TOTAL CA: CPT

## 2019-04-29 PROCEDURE — 99285 EMERGENCY DEPT VISIT HI MDM: CPT

## 2019-04-29 PROCEDURE — 770006 HCHG ROOM/CARE - MED/SURG/GYN SEMI*

## 2019-04-29 PROCEDURE — 700105 HCHG RX REV CODE 258: Performed by: HOSPITALIST

## 2019-04-29 PROCEDURE — 96374 THER/PROPH/DIAG INJ IV PUSH: CPT

## 2019-04-29 PROCEDURE — 74018 RADEX ABDOMEN 1 VIEW: CPT

## 2019-04-29 PROCEDURE — 700105 HCHG RX REV CODE 258: Performed by: EMERGENCY MEDICINE

## 2019-04-29 PROCEDURE — 700111 HCHG RX REV CODE 636 W/ 250 OVERRIDE (IP)

## 2019-04-29 PROCEDURE — 83690 ASSAY OF LIPASE: CPT

## 2019-04-29 PROCEDURE — 74177 CT ABD & PELVIS W/CONTRAST: CPT

## 2019-04-29 PROCEDURE — 81003 URINALYSIS AUTO W/O SCOPE: CPT

## 2019-04-29 PROCEDURE — 700111 HCHG RX REV CODE 636 W/ 250 OVERRIDE (IP): Performed by: HOSPITALIST

## 2019-04-29 PROCEDURE — 700117 HCHG RX CONTRAST REV CODE 255: Performed by: EMERGENCY MEDICINE

## 2019-04-29 PROCEDURE — 85025 COMPLETE CBC W/AUTO DIFF WBC: CPT

## 2019-04-29 RX ORDER — PROMETHAZINE HYDROCHLORIDE 25 MG/1
12.5-25 SUPPOSITORY RECTAL EVERY 4 HOURS PRN
Status: DISCONTINUED | OUTPATIENT
Start: 2019-04-29 | End: 2019-04-29 | Stop reason: HOSPADM

## 2019-04-29 RX ORDER — AMOXICILLIN 250 MG
2 CAPSULE ORAL 2 TIMES DAILY
Status: DISCONTINUED | OUTPATIENT
Start: 2019-04-29 | End: 2019-04-29 | Stop reason: HOSPADM

## 2019-04-29 RX ORDER — PROMETHAZINE HYDROCHLORIDE 25 MG/1
12.5-25 TABLET ORAL EVERY 4 HOURS PRN
Status: DISCONTINUED | OUTPATIENT
Start: 2019-04-29 | End: 2019-04-29 | Stop reason: HOSPADM

## 2019-04-29 RX ORDER — ONDANSETRON 2 MG/ML
4 INJECTION INTRAMUSCULAR; INTRAVENOUS EVERY 4 HOURS PRN
Status: DISCONTINUED | OUTPATIENT
Start: 2019-04-29 | End: 2019-04-29 | Stop reason: HOSPADM

## 2019-04-29 RX ORDER — BISACODYL 10 MG
10 SUPPOSITORY, RECTAL RECTAL
Status: DISCONTINUED | OUTPATIENT
Start: 2019-04-29 | End: 2019-04-29 | Stop reason: HOSPADM

## 2019-04-29 RX ORDER — ONDANSETRON 2 MG/ML
4 INJECTION INTRAMUSCULAR; INTRAVENOUS ONCE
Status: COMPLETED | OUTPATIENT
Start: 2019-04-29 | End: 2019-04-29

## 2019-04-29 RX ORDER — SODIUM CHLORIDE 9 MG/ML
INJECTION, SOLUTION INTRAVENOUS CONTINUOUS
Status: DISCONTINUED | OUTPATIENT
Start: 2019-04-29 | End: 2019-04-29

## 2019-04-29 RX ORDER — POLYETHYLENE GLYCOL 3350 17 G/17G
1 POWDER, FOR SOLUTION ORAL
Status: DISCONTINUED | OUTPATIENT
Start: 2019-04-29 | End: 2019-04-29 | Stop reason: HOSPADM

## 2019-04-29 RX ORDER — KETOROLAC TROMETHAMINE 30 MG/ML
INJECTION, SOLUTION INTRAMUSCULAR; INTRAVENOUS
Status: COMPLETED
Start: 2019-04-29 | End: 2019-04-29

## 2019-04-29 RX ORDER — KETOROLAC TROMETHAMINE 30 MG/ML
15 INJECTION, SOLUTION INTRAMUSCULAR; INTRAVENOUS ONCE
Status: COMPLETED | OUTPATIENT
Start: 2019-04-29 | End: 2019-04-29

## 2019-04-29 RX ORDER — ONDANSETRON 4 MG/1
4 TABLET, ORALLY DISINTEGRATING ORAL EVERY 4 HOURS PRN
Status: DISCONTINUED | OUTPATIENT
Start: 2019-04-29 | End: 2019-04-29 | Stop reason: HOSPADM

## 2019-04-29 RX ORDER — SODIUM CHLORIDE 9 MG/ML
INJECTION, SOLUTION INTRAVENOUS CONTINUOUS
Status: DISCONTINUED | OUTPATIENT
Start: 2019-04-29 | End: 2019-04-29 | Stop reason: HOSPADM

## 2019-04-29 RX ADMIN — PROCHLORPERAZINE EDISYLATE 10 MG: 5 INJECTION INTRAMUSCULAR; INTRAVENOUS at 05:17

## 2019-04-29 RX ADMIN — SODIUM CHLORIDE: 9 INJECTION, SOLUTION INTRAVENOUS at 02:58

## 2019-04-29 RX ADMIN — IOHEXOL 100 ML: 350 INJECTION, SOLUTION INTRAVENOUS at 04:22

## 2019-04-29 RX ADMIN — ONDANSETRON 4 MG: 2 INJECTION INTRAMUSCULAR; INTRAVENOUS at 02:59

## 2019-04-29 RX ADMIN — ONDANSETRON 4 MG: 2 INJECTION INTRAMUSCULAR; INTRAVENOUS at 08:08

## 2019-04-29 RX ADMIN — KETOROLAC TROMETHAMINE 15 MG: 30 INJECTION, SOLUTION INTRAMUSCULAR at 02:59

## 2019-04-29 RX ADMIN — SODIUM CHLORIDE: 9 INJECTION, SOLUTION INTRAVENOUS at 06:10

## 2019-04-29 RX ADMIN — KETOROLAC TROMETHAMINE 15 MG: 30 INJECTION, SOLUTION INTRAMUSCULAR; INTRAVENOUS at 02:59

## 2019-04-29 ASSESSMENT — COPD QUESTIONNAIRES
HAVE YOU SMOKED AT LEAST 100 CIGARETTES IN YOUR ENTIRE LIFE: NO/DON'T KNOW
DO YOU EVER COUGH UP ANY MUCUS OR PHLEGM?: NO/ONLY WITH OCCASIONAL COLDS OR INFECTIONS
DURING THE PAST 4 WEEKS HOW MUCH DID YOU FEEL SHORT OF BREATH: NONE/LITTLE OF THE TIME
IN THE PAST 12 MONTHS DO YOU DO LESS THAN YOU USED TO BECAUSE OF YOUR BREATHING PROBLEMS: DISAGREE/UNSURE

## 2019-04-29 ASSESSMENT — LIFESTYLE VARIABLES
EVER FELT BAD OR GUILTY ABOUT YOUR DRINKING: NO
EVER HAD A DRINK FIRST THING IN THE MORNING TO STEADY YOUR NERVES TO GET RID OF A HANGOVER: NO
EVER_SMOKED: YES
ALCOHOL_USE: YES
HOW MANY TIMES IN THE PAST YEAR HAVE YOU HAD 5 OR MORE DRINKS IN A DAY: 0
TOTAL SCORE: 0
HAVE YOU EVER FELT YOU SHOULD CUT DOWN ON YOUR DRINKING: NO
ON A TYPICAL DAY WHEN YOU DRINK ALCOHOL HOW MANY DRINKS DO YOU HAVE: 0
CONSUMPTION TOTAL: NEGATIVE
TOTAL SCORE: 0
HAVE PEOPLE ANNOYED YOU BY CRITICIZING YOUR DRINKING: NO
TOTAL SCORE: 0
AVERAGE NUMBER OF DAYS PER WEEK YOU HAVE A DRINK CONTAINING ALCOHOL: .5

## 2019-04-29 ASSESSMENT — ENCOUNTER SYMPTOMS
NAUSEA: 1
PALPITATIONS: 0
SORE THROAT: 0
ABDOMINAL PAIN: 1
DOUBLE VISION: 0
BLURRED VISION: 0
WEAKNESS: 0
INSOMNIA: 0
NECK PAIN: 0
DIZZINESS: 0
DEPRESSION: 0
COUGH: 0
FEVER: 0
MYALGIAS: 0
VOMITING: 1
BRUISES/BLEEDS EASILY: 0
HEADACHES: 0
SHORTNESS OF BREATH: 0

## 2019-04-29 ASSESSMENT — COGNITIVE AND FUNCTIONAL STATUS - GENERAL
DAILY ACTIVITIY SCORE: 24
DAILY ACTIVITIY SCORE: 24
SUGGESTED CMS G CODE MODIFIER DAILY ACTIVITY: CH
SUGGESTED CMS G CODE MODIFIER DAILY ACTIVITY: CH
MOBILITY SCORE: 24
SUGGESTED CMS G CODE MODIFIER MOBILITY: CH

## 2019-04-29 ASSESSMENT — PATIENT HEALTH QUESTIONNAIRE - PHQ9
1. LITTLE INTEREST OR PLEASURE IN DOING THINGS: NOT AT ALL
2. FEELING DOWN, DEPRESSED, IRRITABLE, OR HOPELESS: NOT AT ALL
SUM OF ALL RESPONSES TO PHQ9 QUESTIONS 1 AND 2: 0

## 2019-04-29 NOTE — PROGRESS NOTES
Med rec updated and complete  Allergies reviewed  Interviewed pt with friend at bedside with permission from pt.  Pt reports no prescription medications, OTC's, or vitamins.  Pt reports no antibiotics in the last 30 days.

## 2019-04-29 NOTE — DISCHARGE SUMMARY
Discharge Summary    CHIEF COMPLAINT ON ADMISSION  Chief Complaint   Patient presents with   • Abdominal Pain     pt c/o umbilical abd pain constant since 1300 yesterday, +N, denies urinary Sx, pt in NAD. VSS. pt aox3       Reason for Admission  Abdominal pain     Admission Date  4/29/2019    CODE STATUS  Full Code    HPI & HOSPITAL COURSE  This is a 33 y.o. Female with no significant past medical history here with abdominal pain, nausea and dry heaving.  She had abdominal imaging performed in the emergency department which suggested possible early small bowel obstruction.  General surgery was consulted and an NG tube was placed.  She made a much more rapid than expected recovery based on her initial presentation and her nausea and dry heaving immediately resolved with placement of the NG tube.  Minimal output was noted from the tube.  She was seen by surgery and was feeling much better therefore they recommended to remove the NG tube.  She began passing gas and her abdominal discomfort quickly resolved.  It was thought that this was likely related to a gastroenteritis rather than small bowel obstruction.  Her diet was slowly advanced to clear liquids and well-tolerated.  Following this, it was advanced to full liquids which was also well-tolerated therefore, she was determined stable for discharge.  She was passing gas and urinating well at the time of discharge per       Therefore, she is discharged in good and stable condition to home with close outpatient follow-up.    The patient recovered much more quickly than anticipated on admission.    Discharge Date  4/29/2018    FOLLOW UP ITEMS POST DISCHARGE  Follow-up with primary care    DISCHARGE DIAGNOSES  Principal Problem:    Gastroenteritis POA: Unknown  Active Problems:    Elevated glucose POA: Unknown    Smoker POA: Unknown    BMI 29.0-29.9,adult POA: Unknown    SBO (small bowel obstruction) ([facility]) POA: Unknown  Resolved Problems:    * No resolved hospital  problems. *      FOLLOW UP  With PCP as needed    MEDICATIONS ON DISCHARGE  None prescribed    Allergies  Allergies   Allergen Reactions   • Amoxicillin Unspecified     Rxn - seizure   • Penicillins Unspecified     Rxn - seizure       DIET  Orders Placed This Encounter   Procedures   • Diet Order Full Liquid     Standing Status:   Standing     Number of Occurrences:   1     Order Specific Question:   Diet:     Answer:   Full Liquid [11]     ADAT      ACTIVITY  As tolerated.  Weight bearing as tolerated    CONSULTATIONS  Dr. Estrada- Surgery    PROCEDURES  None    LABORATORY  Lab Results   Component Value Date    SODIUM 136 04/29/2019    POTASSIUM 3.7 04/29/2019    CHLORIDE 108 04/29/2019    CO2 22 04/29/2019    GLUCOSE 121 (H) 04/29/2019    BUN 17 04/29/2019    CREATININE 0.63 04/29/2019        Lab Results   Component Value Date    WBC 10.4 04/29/2019    HEMOGLOBIN 15.0 04/29/2019    HEMATOCRIT 43.3 04/29/2019    PLATELETCT 240 04/29/2019        Total time of the discharge process exceeds 39 minutes.

## 2019-04-29 NOTE — PROGRESS NOTES
· 2 RN skin check complete with CORY Ellis.  · Devices in place n/a.  · Skin assessed under devices n/a.  · Confirmed pressure ulcers found on n/a.  · New potential pressure ulcers noted on n/a. Wound consult placed and wound reported.  · The following interventions in place encourage pt to ambulate, monitor nutritional status*

## 2019-04-29 NOTE — ED PROVIDER NOTES
ED Provider Note    CHIEF COMPLAINT  Chief Complaint   Patient presents with   • Abdominal Pain     pt c/o umbilical abd pain constant since 1300 yesterday, +N, denies urinary Sx, pt in NAD. VSS. pt aox3       HPI  Donovan Shrestha is a 33 y.o. female with no major past medical history who presents with acute onset of periumbilical abdominal pain yesterday around 1 PM.  Associate with some nausea, no active vomiting, but also has had decreased bowel movements.  No dysuria, no urgency, no frequency.  No prior history of abdominal surgeries.  No abdominal distention.  No recent illnesses.  Not pain like this before.  Since starting pain has stayed periumbilical, is nonradiating, is dull, there have been no noted relievers or exacerbators.  Last bowel movement yesterday morning, this is normal for her. Has not passed gas since abd pain started.  LMP started yesterday.  Presents now for evaluation because pain is been persistent.  No vaginal discharge    REVIEW OF SYSTEMS  Pertinent positives: Abdominal pain, currently on menstrual cycle, nausea  Pertinent negatives: No vomiting, no dysuria, no urgency, no frequency, no fevers, no cough, no congestion  10 + systems reviewed and otherwise negative    PAST MEDICAL HISTORY       SOCIAL HISTORY  Social History     Social History Main Topics   • Smoking status: Current Every Day Smoker     Packs/day: 0.25     Years: 10.00     Types: Cigarettes   • Smokeless tobacco: Never Used      Comment: currently smoking about 2 cigs/day   • Alcohol use Yes      Comment: occasional    • Drug use: No   • Sexual activity: Yes     Partners: Male       SURGICAL HISTORY   has a past surgical history that includes tonsillectomy.    CURRENT MEDICATIONS  Home Medications     Reviewed by Galo Whiteside R.N. (Registered Nurse) on 04/29/19 at 0228  Med List Status: Not Addressed   Medication Last Dose Status   ibuprofen (MOTRIN) 800 MG TABS  Active   Norethin-Eth Estrad-Fe Biphas (LO LOESTRIN FE) 1  "MG-10 MCG / 10 MCG Tab  Active                ALLERGIES  Allergies   Allergen Reactions   • Amoxicillin Unspecified     Rxn - seizure   • Penicillins Unspecified     Rxn - seizure       PHYSICAL EXAM  VITAL SIGNS: /99   Pulse 82   Temp 36.9 °C (98.4 °F) (Oral)   Resp 16   Ht 1.676 m (5' 6\")   Wt 82 kg (180 lb 12.4 oz)   LMP 04/28/2019   BMI 29.18 kg/m²   Pulse ox interpretation: I interpret this pulse ox as normal.  Constitutional: Alert in no apparent distress   HEENT: EOMI, PERRL, mucous membranes moist, posterior OP clear without exudate   Neck: supple, no cervical lymphadenopathy  Cardiovascular:  Regular rate and rhythm without murmurs   Chest wall: No tenderness to palpation   Thorax & Lungs: clear to auscultation bilaterally, no increased WOB, no wheeze or rhonchi   Abdomen: Soft throughout without rebound or guarding, no CVAT, unable to reproduce abdominal with deep palpation, no suprapubic tenderness.  Skin: Warm, Dry, No erythema, no rash   Musculoskeletal: Good range of motion in all major joints.    Neurologic:  Alert and oriented, without focal deficits. Fluent speech.   Psychiatric: Affect normal, Judgment normal, Mood normal.       DIAGNOSTIC STUDIES / PROCEDURES      LABS  Results for orders placed or performed during the hospital encounter of 04/29/19   CBC WITH DIFFERENTIAL   Result Value Ref Range    WBC 10.4 4.8 - 10.8 K/uL    RBC 4.83 4.20 - 5.40 M/uL    Hemoglobin 15.0 12.0 - 16.0 g/dL    Hematocrit 43.3 37.0 - 47.0 %    MCV 89.6 81.4 - 97.8 fL    MCH 31.1 27.0 - 33.0 pg    MCHC 34.6 33.6 - 35.0 g/dL    RDW 39.8 35.9 - 50.0 fL    Platelet Count 240 164 - 446 K/uL    MPV 9.3 9.0 - 12.9 fL    Neutrophils-Polys 83.10 (H) 44.00 - 72.00 %    Lymphocytes 9.20 (L) 22.00 - 41.00 %    Monocytes 4.40 0.00 - 13.40 %    Eosinophils 2.40 0.00 - 6.90 %    Basophils 0.40 0.00 - 1.80 %    Immature Granulocytes 0.50 0.00 - 0.90 %    Nucleated RBC 0.00 /100 WBC    Neutrophils (Absolute) 8.67 (H) " 2.00 - 7.15 K/uL    Lymphs (Absolute) 0.96 (L) 1.00 - 4.80 K/uL    Monos (Absolute) 0.46 0.00 - 0.85 K/uL    Eos (Absolute) 0.25 0.00 - 0.51 K/uL    Baso (Absolute) 0.04 0.00 - 0.12 K/uL    Immature Granulocytes (abs) 0.05 0.00 - 0.11 K/uL    NRBC (Absolute) 0.00 K/uL   COMP METABOLIC PANEL   Result Value Ref Range    Sodium 134 (L) 135 - 145 mmol/L    Potassium 3.9 3.6 - 5.5 mmol/L    Chloride 105 96 - 112 mmol/L    Co2 20 20 - 33 mmol/L    Anion Gap 9.0 0.0 - 11.9    Glucose 129 (H) 65 - 99 mg/dL    Bun 24 (H) 8 - 22 mg/dL    Creatinine 0.78 0.50 - 1.40 mg/dL    Calcium 8.7 8.4 - 10.2 mg/dL    AST(SGOT) 20 12 - 45 U/L    ALT(SGPT) 21 2 - 50 U/L    Alkaline Phosphatase 77 30 - 99 U/L    Total Bilirubin 0.6 0.1 - 1.5 mg/dL    Albumin 3.8 3.2 - 4.9 g/dL    Total Protein 6.5 6.0 - 8.2 g/dL    Globulin 2.7 1.9 - 3.5 g/dL    A-G Ratio 1.4 g/dL   LIPASE   Result Value Ref Range    Lipase 30 7 - 58 U/L   HCG QUAL SERUM   Result Value Ref Range    Beta-Hcg Qualitative Serum Negative Negative   URINALYSIS,CULTURE IF INDICATED   Result Value Ref Range    Color Yellow     Character Clear     Specific Gravity 1.020 <1.035    Ph 5.5 5.0 - 8.0    Glucose Negative Negative mg/dL    Ketones Negative Negative mg/dL    Protein Negative Negative mg/dL    Bilirubin Negative Negative    Nitrite Negative Negative    Leukocyte Esterase Negative Negative    Occult Blood Moderate (A) Negative    Micro Urine Req Microscopic    ESTIMATED GFR   Result Value Ref Range    GFR If African American >60 >60 mL/min/1.73 m 2    GFR If Non African American >60 >60 mL/min/1.73 m 2         RADIOLOGY  CT-ABDOMEN-PELVIS WITH   Final Result      1.  Findings consistent with early or partial mid small bowel obstruction, possibly secondary to enteritis.   2.  No evidence for bowel perforation.   3.  Normal appendix.          COURSE & MEDICAL DECISION MAKING  Nursing notes and vital signs were reviewed. (See chart for details)  The patient's medical  records  were reviewed, history was obtained from the patient.    33-year-old female who presents with periumbilical abdominal pain that is been constant in location and duration since beginning greater than 14 hours ago.  Physical exam is notable for soft abdomen throughout without rebound or guarding, unable to reproduce abdominal pain with deep palpation.  Differential includes appendicitis, colitis, obstruction, urinary tract infection, renal colic, pregnancy, ectopic, STI, ovarian torsion. Plan for CBC, chemistry, gentle IV fluid hydration given n.p.o. status, ketorolac, Zofran, and reassessment after above interventions. Belly is soft throughout, there is no rebound or guarding, there is no active vomiting, n    3:39 AM  Labs as above, no acute findings.  Patient reexamined, now tender in right lower quadrant.  CT scan of the abdomen pelvis has been ordered for further evaluation.    4:34 AM  CT imaging as above. Discussed with Dr. Estrada who requests admission to medicine. Discussed with Dr. Bronson, who will admit. Updated patient on her diagnosis and plan of care. NGT ordered. Pt admitted to Dr. Bronson in fair condition.    INTERVENTIONS  Medications   NS infusion ( Intravenous Dose not Required 4/29/19 0315)   ondansetron (ZOFRAN) syringe/vial injection 4 mg (4 mg Intravenous Given 4/29/19 0259)   ketorolac (TORADOL) injection 15 mg (15 mg Intravenous Given 4/29/19 0259)   iohexol (OMNIPAQUE) 350 mg/mL (100 mL Intravenous Given 4/29/19 0422)         FINAL IMPRESSION  (K56.609) SBO (small bowel obstruction) ([facility])  (R10.33) Periumbilical abdominal pain      Electronically signed by: Cheyenne Ledesma, 4/29/2019 2:41 AM      This dictation was created using voice recognition software. The accuracy of the dictation is limited to the abilities of the software. I expect there may be some errors of grammar and possibly content. The nursing notes were reviewed and certain aspects of this information were incorporated into this  note.

## 2019-04-29 NOTE — PROGRESS NOTES
Report received from CORY Ortiz. Patient resting comfortably in bed. Significant other at bedside. NG tube to low intermittent suction. Declines any needs at this time. Call light in reach.

## 2019-04-29 NOTE — ASSESSMENT & PLAN NOTE
-Glucose on admission is 129.  This is likely reactive.  Will monitor and if remains elevated consider hemoglobin A1c.

## 2019-04-29 NOTE — PROGRESS NOTES
Up to bathroom. Nausea and dry heaving when returning back to bed. Recent admin of zofran. Will continue to monitor.

## 2019-04-29 NOTE — OP REPORT
DATE OF SERVICE:  04/29/2019    SURGICAL CONSULTATION    HISTORY OF PRESENT ILLNESS:  The patient is a 33-year-old woman who I have   been asked to evaluate further for possible bowel obstruction.  She gives a   history 1 day of abdominal pain associated with some nausea and dry heaving.    She had not had any savannah emesis until an NG tube was placed in the emergency   room.  She continues to have flatus, but has not had recent bowel movement.    Her pain is in the midepigastric area and does not radiate.    PAST MEDICAL HISTORY:  Her medical history is generally unremarkable.    SURGICAL HISTORY:  Tonsillectomy.    MEDICATIONS:  Prior to this admission, birth control pills and p.r.n.   ibuprofen.    ALLERGIES:  SHE HAS ALLERGIES TO AMOXICILLIN AND PENICILLIN.    SOCIAL HISTORY:  She smokes cigarettes.  She drinks alcohol.  Does not use   illicit drugs.    FAMILY HISTORY:  Significant for breast cancer in grandparents, hypertension   and prostate cancer in her father.    REVIEW OF SYSTEMS:  Baseline state of health until the acute onset of   abdominal symptoms in essentially the past 24 hours.  She has not noted any   recent fevers, chills, sweats or acute weight changes, and she has not had any   change in her bowel habits.    PHYSICAL EXAMINATION:  VITAL SIGNS:  She currently has a temperature of 36.7, pulse of 69, blood   pressure is 113/72.  GENERAL:  She is lying in bed and is in no distress.  NG tube is in place.  HEENT:  Unremarkable.  Pupils are equal.  Oropharynx is without lesions.  NECK:  Supple.  LUNGS:  Clear.  CARDIOVASCULAR:  Reveals regular rate and rhythm.  ABDOMEN:  Mildly distended and essentially nontender.  EXTREMITIES:  Symmetrical without clubbing, cyanosis, or edema.  SKIN:  Warm and dry.  She has palpable radial and femoral pulses.  NEUROLOGICAL:  She is neurologically intact without any grossly detectable   motor or sensory deficits.    LABORATORY DATA:  Includes a white count of 10.4,  hematocrit of 43.3,   platelets of 240.  Sodium is 134, potassium 3.9, chloride is 105, CO2 is 20,   BUN is 24, creatinine 0.78.  Transaminases are normal.  She did have a CT scan   done this morning, which showed some mild wall thickening of the proximal and   distal small bowel with associated hyperemia.  The bowel was noted be fluid   filled.  This was felt to be consistent with early partial small bowel   obstruction.    IMPRESSION:  A 33-year-old woman with abdominal pain, some thickened bowel,   which was fluid filled, dry heaves, and one episode of emesis.  This is almost   certainly primarily enteritis and not a mechanical bowel obstruction.  I   discussed this with the patient.  She has had no surgery and there is no   evidence of a true obstructing lesion.  Again, her history is highly   consistent with enteritis and not with a mechanical obstruction and it is   highly unlikely that she will require any type of surgical intervention.  I   discussed this with the patient and her attending hospitalist, Dr. Betancur.    She is in agreement.  I will not follow the patient along given her current   clinical picture, but certainly will be available if her clinical picture   changes and I discussed this with Dr. Betancur as well.       ____________________________________     MD ADENIKE CHAU / EPHRAIM    DD:  04/29/2019 09:39:27  DT:  04/29/2019 10:09:14    D#:  [id number]  Job#:  [id number]

## 2019-04-29 NOTE — ED NOTES
Pt states that at 1300 yesterday, she started having sharp, connie-umbilical pain. Pt reports pain is non radiating. She reports nausea without emesis. Denies urinary issues. Pt states that her last bowel movement was yesterday and was loose.

## 2019-04-29 NOTE — H&P
Hospital Medicine History & Physical Note    Date of Service  4/29/2019    Primary Care Physician  Jennifer Orlando P.A.-C.    Consultants  General Surgery: Dr. Estrada    Code Status  Full Time    Chief Complaint  Abdominal Pain    History of Presenting Illness  33 y.o. female who presented to the ER on 4/29/2019 with abdominal pain:    1) ABDOMINAL PAIN:  -Onset: Around 1 PM yesterday.  -Pain is epigastric with diffuse radiation.  -Constant, rated as 6-7/10 in intensity.  -Associated with nausea/dry heaving.  She denies vomiting.    No prior history of abdominal surgeries.  CT scan showing early partial small bowel obstruction.    Review of Systems  Review of Systems   Constitutional: Negative for fever.   HENT: Negative for congestion and sore throat.    Eyes: Negative for blurred vision and double vision.   Respiratory: Negative for cough and shortness of breath.    Cardiovascular: Negative for chest pain and palpitations.   Gastrointestinal: Positive for abdominal pain, nausea and vomiting.   Genitourinary: Negative for dysuria and urgency.   Musculoskeletal: Negative for myalgias and neck pain.   Skin: Negative for itching and rash.   Neurological: Negative for dizziness, weakness and headaches.   Endo/Heme/Allergies: Does not bruise/bleed easily.   Psychiatric/Behavioral: Negative for depression. The patient does not have insomnia.        Past Medical History   has no past medical history on file.    Surgical History   has a past surgical history that includes tonsillectomy.     Family History  family history includes Breast Cancer in her maternal grandmother and paternal grandmother; Cancer in her father, maternal grandfather, maternal grandmother, and paternal grandmother; Hypertension in her brother, paternal aunt, paternal grandfather, paternal grandmother, and paternal uncle; Prostate cancer in her father.     Social History   reports that she has been smoking Cigarettes.  She has a 2.50 pack-year  smoking history. She has never used smokeless tobacco. She reports that she drinks alcohol. She reports that she does not use drugs.    Allergies  Allergies   Allergen Reactions   • Amoxicillin Unspecified     Rxn - seizure   • Penicillins Unspecified     Rxn - seizure       Medications  Prior to Admission Medications   Prescriptions Last Dose Informant Patient Reported? Taking?   Norethin-Eth Estrad-Fe Biphas (LO LOESTRIN FE) 1 MG-10 MCG / 10 MCG Tab   No No   Sig: Take 1 Tab by mouth every day.   ibuprofen (MOTRIN) 800 MG TABS   No No   Sig: Take 1 Tab by mouth every 8 hours as needed.      Facility-Administered Medications: None       Physical Exam  Temp:  [36.9 °C (98.4 °F)] 36.9 °C (98.4 °F)  Pulse:  [68-82] 68  Resp:  [16-18] 18  BP: (116-125)/(68-99) 116/68    Physical Exam   Constitutional: She is oriented to person, place, and time. She appears well-developed and well-nourished.   HENT:   Head: Normocephalic and atraumatic.   Eyes: Pupils are equal, round, and reactive to light. EOM are normal.   Neck: Normal range of motion. Neck supple.   Cardiovascular: Normal rate and regular rhythm.    Pulmonary/Chest: Effort normal and breath sounds normal.   Abdominal: Soft. Bowel sounds are normal. She exhibits distension (Mild). There is tenderness (Minimal epigastric pain). There is no rebound and no guarding.   Musculoskeletal: Normal range of motion. She exhibits no edema.   Neurological: She is alert and oriented to person, place, and time.   Skin: Skin is warm and dry.   Psychiatric: She has a normal mood and affect. Her behavior is normal.       Laboratory:  Recent Labs      04/29/19   0245   WBC  10.4   RBC  4.83   HEMOGLOBIN  15.0   HEMATOCRIT  43.3   MCV  89.6   MCH  31.1   MCHC  34.6   RDW  39.8   PLATELETCT  240   MPV  9.3     Recent Labs      04/29/19   0245   SODIUM  134*   POTASSIUM  3.9   CHLORIDE  105   CO2  20   GLUCOSE  129*   BUN  24*   CREATININE  0.78   CALCIUM  8.7     Recent Labs       04/29/19   0245   ALTSGPT  21   ASTSGOT  20   ALKPHOSPHAT  77   TBILIRUBIN  0.6   LIPASE  30   GLUCOSE  129*                 No results for input(s): TROPONINI in the last 72 hours.    Urinalysis:    Recent Labs      04/29/19   0245   SPECGRAVITY  1.020   GLUCOSEUR  Negative   KETONES  Negative   NITRITE  Negative   LEUKESTERAS  Negative        Imaging:  CT-ABDOMEN-PELVIS WITH   Final Result      1.  Findings consistent with early or partial mid small bowel obstruction, possibly secondary to enteritis.   2.  No evidence for bowel perforation.   3.  Normal appendix.      [id number] VIEW    (Results Pending)         Assessment/Plan:  I anticipate this patient will require at least two midnights for appropriate medical management, necessitating inpatient admission.    Elevated glucose   Assessment & Plan    -Glucose on admission is 129.  This is likely reactive.  Will monitor and if remains elevated consider hemoglobin A1c.     SBO (small bowel obstruction) (HCC)   Assessment & Plan    -NPO  -Pain/ Nausea control PRN  -NG Tube: Placed in the ER  -Appreciate General surgery Consult and recommendations: Dr. Estrada  -She has no prior history of abdominal surgeries and no history of small bowel obstruction in the past.     BMI 29.0-29.9,adult   Assessment & Plan    -Recommend heathy life Style.      Smoker   Assessment & Plan    -Smokes 2 to 3 cigarettes daily.  She is not interested in quit smoking at this time.         VTE prophylaxis: SCD's

## 2019-04-29 NOTE — ED NOTES
NG tube placed in right nare, 16 Fr. Pt vomited large amount of emesis, consisting of significantly large pieces of food.

## 2019-04-29 NOTE — ASSESSMENT & PLAN NOTE
-NPO  -Pain/ Nausea control PRN  -NG Tube: Placed in the ER  -Appreciate General surgery Consult and recommendations: Dr. Estrada  -She has no prior history of abdominal surgeries and no history of small bowel obstruction in the past.

## 2019-04-29 NOTE — CARE PLAN
Problem: Pain Management  Goal: Pain level will decrease to patient's comfort goal  Outcome: PROGRESSING AS EXPECTED  No c/o pain this AM    Problem: Safety  Goal: Will remain free from falls  Outcome: PROGRESSING AS EXPECTED  Reinforced fall risk precautions. Non-skid socks on feet, call light in reach. Stand by assist to the bathroom.      Problem: Bowel/Gastric:  Goal: Normal bowel function is maintained or improved  Outcome: PROGRESSING AS EXPECTED  Active bowel sounds, patient reports passing flatus. NG tube to suction, order for D/c, patient tolerated well.     Problem: Knowledge Deficit  Goal: Knowledge of disease process/condition, treatment plan, diagnostic tests, and medications will improve  Outcome: PROGRESSING AS EXPECTED  Discuss POC with Pt. Assess Pt's knowledge of disease process, treatment plan, diagnostic test, labs, and medications; and explain and give information as needed.

## 2019-04-29 NOTE — PROGRESS NOTES
Pt arrived to unit via gurney. Ambulated from gurney to bed, stand by assist. Tele monitor applied, vitals taken. Pt assessed. A&O x4. Admit profile and med rec complete. Discussed POC with pt, including ng tube. Welcome folder provided and discussed. Communication board filled out. Questions and concerns addressed, verbalized understanding. Fall precautions in place. Pt demonstrates ability to use call light appropriately. Pt left in lowest position. Bed locked and low.

## 2019-04-29 NOTE — PROGRESS NOTES
Patient has no complaints of nausea or vomiting, no pain, resting comfortably. Will continue to monitor

## 2019-04-30 NOTE — DISCHARGE INSTRUCTIONS
Viral Gastroenteritis, Adult  Introduction  Viral gastroenteritis is also known as the stomach flu. This condition is caused by certain germs (viruses). These germs can be passed from person to person very easily (are very contagious). This condition can cause sudden watery poop (diarrhea), fever, and throwing up (vomiting).  Having watery poop and throwing up can make you feel weak and cause you to get dehydrated. Dehydration can make you tired and thirsty, make you have a dry mouth, and make it so you pee (urinate) less often. Older adults and people with other diseases or a weak defense system (immune system) are at higher risk for dehydration. It is important to replace the fluids that you lose from having watery poop and throwing up.  Follow these instructions at home:  Follow instructions from your doctor about how to care for yourself at home.  Eating and drinking  Follow these instructions as told by your doctor:  · Take an oral rehydration solution (ORS). This is a drink that is sold at pharmacies and stores.  · Drink clear fluids in small amounts as you are able, such as:  ¨ Water.  ¨ Ice chips.  ¨ Diluted fruit juice.  ¨ Low-calorie sports drinks.  · Eat bland, easy-to-digest foods in small amounts as you are able, such as:  ¨ Bananas.  ¨ Applesauce.  ¨ Rice.  ¨ Low-fat (lean) meats.  ¨ Toast.  ¨ Crackers.  · Avoid fluids that have a lot of sugar or caffeine in them.  · Avoid alcohol.  · Avoid spicy or fatty foods.  General instructions  · Drink enough fluid to keep your pee (urine) clear or pale yellow.  · Wash your hands often. If you cannot use soap and water, use hand .  · Make sure that all people in your home wash their hands well and often.  · Rest at home while you get better.  · Take over-the-counter and prescription medicines only as told by your doctor.  · Watch your condition for any changes.  · Take a warm bath to help with any burning or pain from having watery poop.  · Keep all  follow-up visits as told by your doctor. This is important.  Contact a doctor if:  · You cannot keep fluids down.  · Your symptoms get worse.  · You have new symptoms.  · You feel light-headed or dizzy.  · You have muscle cramps.  Get help right away if:  · You have chest pain.  · You feel very weak or you pass out (faint).  · You see blood in your throw-up.  · Your throw-up looks like coffee grounds.  · You have bloody or black poop (stools) or poop that look like tar.  · You have a very bad headache, a stiff neck, or both.  · You have a rash.  · You have very bad pain, cramping, or bloating in your belly (abdomen).  · You have trouble breathing.  · You are breathing very quickly.  · Your heart is beating very quickly.  · Your skin feels cold and clammy.  · You feel confused.  · You have pain when you pee.  · You have signs of dehydration, such as:  ¨ Dark pee, hardly any pee, or no pee.  ¨ Cracked lips.  ¨ Dry mouth.  ¨ Sunken eyes.  ¨ Sleepiness.  ¨ Weakness.  This information is not intended to replace advice given to you by your health care provider. Make sure you discuss any questions you have with your health care provider.  Document Released: 06/05/2009 Document Revised: 07/07/2017 Document Reviewed: 08/23/2016  © 2017 Elsevier    Discharge Instructions    Discharged to home by car with relative. Discharged via walking, hospital escort: Yes.  Special equipment needed: Not Applicable    Be sure to schedule a follow-up appointment with your primary care doctor or any specialists as instructed.     Discharge Plan:   Diet Plan: Discussed  Activity Level: Discussed  Smoking Cessation Offered: Patient Refused  Confirmed Follow up Appointment: Appointment Scheduled  Confirmed Symptoms Management: Discussed  Medication Reconciliation Updated: Yes  Influenza Vaccine Indication: Patient Refuses    I understand that a diet low in cholesterol, fat, and sodium is recommended for good health. Unless I have been given  specific instructions below for another diet, I accept this instruction as my diet prescription.   Other diet: Start with full liquid, advance as tolerated to regular    Special Instructions: None    · Is patient discharged on Warfarin / Coumadin?   No     Depression / Suicide Risk    As you are discharged from this [facility] facility, it is important to learn how to keep safe from harming yourself.    Recognize the warning signs:  · Abrupt changes in personality, positive or negative- including increase in energy   · Giving away possessions  · Change in eating patterns- significant weight changes-  positive or negative  · Change in sleeping patterns- unable to sleep or sleeping all the time   · Unwillingness or inability to communicate  · Depression  · Unusual sadness, discouragement and loneliness  · Talk of wanting to die  · Neglect of personal appearance   · Rebelliousness- reckless behavior  · Withdrawal from people/activities they love  · Confusion- inability to concentrate     If you or a loved one observes any of these behaviors or has concerns about self-harm, here's what you can do:  · Talk about it- your feelings and reasons for harming yourself  · Remove any means that you might use to hurt yourself (examples: pills, rope, extension cords, firearm)  · Get professional help from the community (Mental Health, Substance Abuse, psychological counseling)  · Do not be alone:Call your Safe Contact- someone whom you trust who will be there for you.  · Call your local CRISIS HOTLINE 049-0777 or 318-219-2313  · Call your local Children's Mobile Crisis Response Team [place] (589) 205-5591 or www.[employer]  · Call the toll free National Suicide Prevention Hotlines   · National Suicide Prevention Lifeline 459-366-LMKE (4838)  · National Hope Line Network 800-SUICIDE (148-4482)

## 2019-04-30 NOTE — PROGRESS NOTES
Discharge order written. IV removed, patient tolerated well. Belongings gathered. Pt states that all personal belongings are in possession. AVS printed, reviewed and copy signed and placed on the chart. Patient has no further questions. No prescriptions. Discharged in satisfactory condition home with mother. Pt off unit via walking, declined staff escort.

## 2019-05-01 ENCOUNTER — OFFICE VISIT (OUTPATIENT)
Dept: MEDICAL GROUP | Facility: PHYSICIAN GROUP | Age: 34
End: 2019-05-01
Payer: COMMERCIAL

## 2019-05-01 VITALS
OXYGEN SATURATION: 97 % | SYSTOLIC BLOOD PRESSURE: 102 MMHG | WEIGHT: 178 LBS | HEIGHT: 65 IN | BODY MASS INDEX: 29.66 KG/M2 | TEMPERATURE: 98.8 F | HEART RATE: 62 BPM | DIASTOLIC BLOOD PRESSURE: 68 MMHG

## 2019-05-01 DIAGNOSIS — Z00.00 BLOOD TESTS FOR ROUTINE GENERAL PHYSICAL EXAMINATION: ICD-10-CM

## 2019-05-01 DIAGNOSIS — Z87.19 HISTORY OF SMALL BOWEL OBSTRUCTION: ICD-10-CM

## 2019-05-01 PROCEDURE — 99213 OFFICE O/P EST LOW 20 MIN: CPT | Performed by: PHYSICIAN ASSISTANT

## 2019-05-01 NOTE — LETTER
May 1, 2019         Patient: Donovan Shrestha   YOB: 1985   Date of Visit: 5/1/2019           To Whom it May Concern:    Donovan Shrestha was seen in my clinic on 5/1/2019. Please excuse her absences from work on Monday, 4/29/19, Tuesday, 4/30/19, and Wednesday, 5/1/19. She is medically cleared to return to work without restrictions on Thursday, 5/2/19. Thank you.    If you have any questions or concerns, please don't hesitate to call.        Sincerely,           Jennifer Orlando P.A.-C.  Electronically Signed

## 2019-05-01 NOTE — PROGRESS NOTES
"Subjective:   Donovan Shrestha is a 33 y.o. female here today for ER follow-up for bowel obstruction. Is an established patient of mine.    HPI:    Patient presents to the office today in follow-up from recent ER visit to [facility] on 4/29/2019.  She presented with 1 day history of abdominal pain and nausea.  Abdominal imaging was done which was suggestive of small bowel obstruction.  She was treated with NG tube and observation.  Surgery was consulted and recommended no further intervention given rapid improvement.  It was thought that she likely had a gastroenteritis.  She is here today requesting note to return to work. She states that she is feeling back to her normal self. Denies any further abdominal pain, nausea, or vomiting. She has been passing gas, having bowel movements. She comments that her stools are softer than normal, but she is still on mostly liquid diet. Denies bloody stools. Never had fever. Otherwise feels well.      Current medicines (including changes today)  No current outpatient prescriptions on file.     No current facility-administered medications for this visit.      She  has no past medical history on file.    ROS  As per HPI.       Objective:     /68 (BP Location: Left arm, Patient Position: Sitting, BP Cuff Size: Adult)   Pulse 62   Temp 37.1 °C (98.8 °F)   Ht 1.651 m (5' 5\")   Wt 80.7 kg (178 lb)   SpO2 97%  Body mass index is 29.62 kg/m².     Physical Exam:  Constitutional: Alert, well-appearing, no distress.  Skin: Warm, dry, good turgor, no rashes in visible areas.  Eye: Pupils are equal and round, conjunctiva clear, lids normal.  ENMT: Lips without lesions, moist mucus membranes.  Respiratory: Unlabored respiratory effort, lungs clear to auscultation, no wheezes, no rhonchi.  Cardiovascular: Normal S1, S2, no murmur, no lower extremity edema.  Abdomen: Normoactive bowel sounds.  Abdomen is soft, nondistended, nontender throughout.      Assessment " and Plan:   The following treatment plan was discussed    1. History of small bowel obstruction  ER record reviewed including provider note, labs, and imaging.  At this point, her bowel obstruction is clinically resolved.  She has no persistent symptoms.  She was found to have elevated glucose which I suspect was reactive in nature.  We will simply monitor this again when she comes in for her physical exam later this year.  Advised patient that she can increase her diet as tolerated.  Recommend that she advance to soft foods and if she tolerates that fine, can transition back to normal diet.  I have provided her with a work note excusing her for the days that she missed and clearing her to return without restrictions.    2. Blood tests for routine general physical examination  - Basic Metabolic Panel; Future  - Lipid Profile; Future      Followup: Return in about 5 months (around 10/1/2019) for Annual; Short.    Jennifer Orlando P.A.-C.

## 2019-05-07 NOTE — CONSULTS
DATE OF SERVICE:  04/29/2019    SURGICAL CONSULTATION    HISTORY OF PRESENT ILLNESS:  The patient is a 33-year-old woman who I have   been asked to evaluate further for possible bowel obstruction.  She gives a   history 1 day of abdominal pain associated with some nausea and dry heaving.    She had not had any savannah emesis until an NG tube was placed in the emergency   room.  She continues to have flatus, but has not had recent bowel movement.    Her pain is in the midepigastric area and does not radiate.    PAST MEDICAL HISTORY:  Her medical history is generally unremarkable.    SURGICAL HISTORY:  Tonsillectomy.    MEDICATIONS:  Prior to this admission, birth control pills and p.r.n.   ibuprofen.    ALLERGIES:  SHE HAS ALLERGIES TO AMOXICILLIN AND PENICILLIN.    SOCIAL HISTORY:  She smokes cigarettes.  She drinks alcohol.  Does not use   illicit drugs.    FAMILY HISTORY:  Significant for breast cancer in grandparents, hypertension   and prostate cancer in her father.    REVIEW OF SYSTEMS:  Baseline state of health until the acute onset of   abdominal symptoms in essentially the past 24 hours.  She has not noted any   recent fevers, chills, sweats or acute weight changes, and she has not had any   change in her bowel habits.    PHYSICAL EXAMINATION:  VITAL SIGNS:  She currently has a temperature of 36.7, pulse of 69, blood   pressure is 113/72.  GENERAL:  She is lying in bed and is in no distress.  NG tube is in place.  HEENT:  Unremarkable.  Pupils are equal.  Oropharynx is without lesions.  NECK:  Supple.  LUNGS:  Clear.  CARDIOVASCULAR:  Reveals regular rate and rhythm.  ABDOMEN:  Mildly distended and essentially nontender.  EXTREMITIES:  Symmetrical without clubbing, cyanosis, or edema.  SKIN:  Warm and dry.  She has palpable radial and femoral pulses.  NEUROLOGICAL:  She is neurologically intact without any grossly detectable   motor or sensory deficits.    LABORATORY DATA:  Includes a white count of 10.4,  hematocrit of 43.3,   platelets of 240.  Sodium is 134, potassium 3.9, chloride is 105, CO2 is 20,   BUN is 24, creatinine 0.78.  Transaminases are normal.  She did have a CT scan   done this morning, which showed some mild wall thickening of the proximal and   distal small bowel with associated hyperemia.  The bowel was noted be fluid   filled.  This was felt to be consistent with early partial small bowel   obstruction.    IMPRESSION:  A 33-year-old woman with abdominal pain, some thickened bowel,   which was fluid filled, dry heaves, and one episode of emesis.  This is almost   certainly primarily enteritis and not a mechanical bowel obstruction.  I   discussed this with the patient.  She has had no surgery and there is no   evidence of a true obstructing lesion.  Again, her history is highly   consistent with enteritis and not with a mechanical obstruction and it is   highly unlikely that she will require any type of surgical intervention.  I   discussed this with the patient and her attending hospitalist, Dr. Betancur.    She is in agreement.  I will not follow the patient along given her current   clinical picture, but certainly will be available if her clinical picture   changes and I discussed this with Dr. Betancur as well.       ____________________________________     MD ADENIKE CHAU / EPHRAIM    DD:  04/29/2019 09:39:27  DT:  04/29/2019 10:09:14    D#:  [id number]  Job#:  [id number]

## 2019-05-08 ENCOUNTER — OFFICE VISIT (OUTPATIENT)
Dept: MEDICAL GROUP | Facility: PHYSICIAN GROUP | Age: 34
End: 2019-05-08
Payer: COMMERCIAL

## 2019-05-08 ENCOUNTER — TELEPHONE (OUTPATIENT)
Dept: MEDICAL GROUP | Facility: PHYSICIAN GROUP | Age: 34
End: 2019-05-08

## 2019-05-08 VITALS
SYSTOLIC BLOOD PRESSURE: 102 MMHG | DIASTOLIC BLOOD PRESSURE: 70 MMHG | OXYGEN SATURATION: 95 % | WEIGHT: 178 LBS | HEART RATE: 72 BPM | TEMPERATURE: 98.2 F | BODY MASS INDEX: 29.66 KG/M2 | HEIGHT: 65 IN

## 2019-05-08 DIAGNOSIS — K52.9 GASTROENTERITIS: ICD-10-CM

## 2019-05-08 DIAGNOSIS — R10.9 ABDOMINAL PAIN, UNSPECIFIED ABDOMINAL LOCATION: ICD-10-CM

## 2019-05-08 DIAGNOSIS — R11.0 NAUSEA: ICD-10-CM

## 2019-05-08 DIAGNOSIS — R19.7 DIARRHEA, UNSPECIFIED TYPE: ICD-10-CM

## 2019-05-08 PROCEDURE — 99214 OFFICE O/P EST MOD 30 MIN: CPT | Performed by: PHYSICIAN ASSISTANT

## 2019-05-08 RX ORDER — ONDANSETRON 4 MG/1
4 TABLET, FILM COATED ORAL EVERY 6 HOURS PRN
Qty: 12 TAB | Refills: 0 | Status: SHIPPED | OUTPATIENT
Start: 2019-05-08 | End: 2020-08-12

## 2019-05-08 NOTE — LETTER
May 8, 2019         Patient: Donovan Shrestha   YOB: 1985   Date of Visit: 5/8/2019           To Whom it May Concern:    Donovan Shrestha was seen in my clinic on 5/8/2019. She is medically excused from work on 5/8/19 and 5/9/19. She may return to work on 5/10/19. Thank you.    If you have any questions or concerns, please don't hesitate to call.        Sincerely,           Jennifer Orlando P.A.-C.  Electronically Signed

## 2019-05-08 NOTE — PROGRESS NOTES
"Subjective:   Donovan Shrestha is a 33 y.o. female here today for abdominal pain. Is an established patient of mine.    HPI:    Donovan presents to the office today with complaints of abdominal pain, onset this morning after eating eggs for breakfast. Feels \"sharp\" 5/10 pain mostly on right side, but entire abdomen feels sore. Comes in waves. May have pain for 10 minutes and then it subsides for 30 minutes or longer. Associated symptoms include nausea (but no vomiting) and diarrhea with liquid stool. Has had 5 bouts of diarrhea already today. Abdominal pain episodes seem to coincide with onset of the diarrhea. She has not had any hematochezia or melena. Denies fever.    She was recently hospitalized at the end of last month for presumed early small bowel obstruction.  Those symptoms completely resolved up until the pain today.  She does feel that her current symptoms are completely different from when she had the bowel obstruction.    Current medicines (including changes today)  Current Outpatient Prescriptions   Medication Sig Dispense Refill   • ondansetron (ZOFRAN) 4 MG Tab tablet Take 1 Tab by mouth every 6 hours as needed for Nausea/Vomiting. 12 Tab 0     No current facility-administered medications for this visit.      She  has no past medical history on file.    ROS  Cardiovascular ROS: No chest pain  Respiratory ROS: No shortness of breath  Gastrointestinal ROS: as per HPI       Objective:     /70 (BP Location: Right arm, Patient Position: Sitting, BP Cuff Size: Adult)   Pulse 72   Temp 36.8 °C (98.2 °F)   Ht 1.651 m (5' 5\")   Wt 80.7 kg (178 lb)   SpO2 95%  Body mass index is 29.62 kg/m².     Physical Exam:  Constitutional: Alert, well-appearing, no distress.  Skin: Warm, dry, good turgor, no rashes in visible areas.  Eye: Pupils are equal and round, conjunctiva clear, lids normal.  ENMT: Lips without lesions, moist mucus membranes.  Respiratory: Unlabored respiratory effort, lungs clear to " auscultation, no wheezes, no rhonchi.  Cardiovascular: Normal S1, S2, no murmur.  Abdomen: Normoactive bowel sounds.  Abdomen is soft and appears nondistended.  She does not have any current tenderness on exam either to light or deep palpation.  There is no palpable organomegaly.      Assessment and Plan:   The following treatment plan was discussed    1. Gastroenteritis  New problem, uncontrolled.  Current clinical presentation is most consistent with gastroenteritis.  I do not have concern for bowel obstruction at this time.  She does not have any current abdominal tenderness.  We discussed importance of liquid diet and increasing to bland soft foods as tolerated. Will obtain lab work to help rule out other causes. I have prescribed her Zofran to take as needed for nausea.  Can use over-the-counter Imodium as needed for diarrhea.  She was provided with a work note as well excusing her today and tomorrow with return on Friday assuming she is feeling better.  We discussed that development of severe abdominal pain and/or bloody stools warrant ER evaluation.  We also discussed that viral gastroenteritis is generally pretty short-lived, so if she is not noticing at least some improvement in the next few days, needs to follow-up with me.    2. Abdominal pain, unspecified abdominal location  New problem, uncontrolled, suspect secondary to gastroenteritis. Work-up and treatment as per above.  - CBC WITH DIFFERENTIAL; Future  - Comp Metabolic Panel; Future  - LIPASE; Future  - AMYLASE; Future    3. Nausea  New problem, uncontrolled, suspect secondary to gastroenteritis. Work-up and treatment as per above.  - ondansetron (ZOFRAN) 4 MG Tab tablet; Take 1 Tab by mouth every 6 hours as needed for Nausea/Vomiting.  Dispense: 12 Tab; Refill: 0    4. Diarrhea, unspecified type  New problem, uncontrolled, suspect secondary to gastroenteritis. Work-up and treatment as per above.      Followup: Return if symptoms worsen or fail to  improve.    Jennifer Orlando P.A.-C.

## 2019-05-08 NOTE — TELEPHONE ENCOUNTER
1. Caller Name: Donovan                                         Call Back Number: [phone] (home)       Patient approves a detailed voicemail message: N\A    2. What are the patient's symptoms (location & severity)? Nausea, diaherrea and stomach pain     3. Is this a new symptom No    4. When did it start? This morning    5. Action taken per Active Symptom Guide: Same day appointment scheduled    6. Patient agrees to recommended action per Active Symptom Escalation Protocol.   Future Appointments       Provider Department Center    5/8/2019 2:45 PM Jennifer Orlando P.A.-C. [facility]    10/3/2019 10:25 AM Jennifer Orlando P.A.-C. [facility]

## 2019-05-09 ENCOUNTER — HOSPITAL ENCOUNTER (OUTPATIENT)
Dept: LAB | Facility: MEDICAL CENTER | Age: 34
End: 2019-05-09
Attending: PHYSICIAN ASSISTANT
Payer: COMMERCIAL

## 2019-05-09 DIAGNOSIS — Z00.00 BLOOD TESTS FOR ROUTINE GENERAL PHYSICAL EXAMINATION: ICD-10-CM

## 2019-05-09 DIAGNOSIS — R10.9 ABDOMINAL PAIN, UNSPECIFIED ABDOMINAL LOCATION: ICD-10-CM

## 2019-05-09 LAB
ALBUMIN SERPL BCP-MCNC: 4.5 G/DL (ref 3.2–4.9)
ALBUMIN/GLOB SERPL: 1.8 G/DL
ALP SERPL-CCNC: 71 U/L (ref 30–99)
ALT SERPL-CCNC: 23 U/L (ref 2–50)
AMYLASE SERPL-CCNC: 35 U/L (ref 20–103)
ANION GAP SERPL CALC-SCNC: 9 MMOL/L (ref 0–11.9)
AST SERPL-CCNC: 19 U/L (ref 12–45)
BASOPHILS # BLD AUTO: 0.8 % (ref 0–1.8)
BASOPHILS # BLD: 0.08 K/UL (ref 0–0.12)
BILIRUB SERPL-MCNC: 0.3 MG/DL (ref 0.1–1.5)
BUN SERPL-MCNC: 15 MG/DL (ref 8–22)
CALCIUM SERPL-MCNC: 9.3 MG/DL (ref 8.5–10.5)
CHLORIDE SERPL-SCNC: 106 MMOL/L (ref 96–112)
CHOLEST SERPL-MCNC: 183 MG/DL (ref 100–199)
CO2 SERPL-SCNC: 24 MMOL/L (ref 20–33)
CREAT SERPL-MCNC: 0.85 MG/DL (ref 0.5–1.4)
EOSINOPHIL # BLD AUTO: 0.39 K/UL (ref 0–0.51)
EOSINOPHIL NFR BLD: 3.9 % (ref 0–6.9)
ERYTHROCYTE [DISTWIDTH] IN BLOOD BY AUTOMATED COUNT: 42.2 FL (ref 35.9–50)
GLOBULIN SER CALC-MCNC: 2.5 G/DL (ref 1.9–3.5)
GLUCOSE SERPL-MCNC: 85 MG/DL (ref 65–99)
HCT VFR BLD AUTO: 46.1 % (ref 37–47)
HDLC SERPL-MCNC: 58 MG/DL
HGB BLD-MCNC: 15.6 G/DL (ref 12–16)
IMM GRANULOCYTES # BLD AUTO: 0.04 K/UL (ref 0–0.11)
IMM GRANULOCYTES NFR BLD AUTO: 0.4 % (ref 0–0.9)
LDLC SERPL CALC-MCNC: 96 MG/DL
LIPASE SERPL-CCNC: 18 U/L (ref 11–82)
LYMPHOCYTES # BLD AUTO: 3.48 K/UL (ref 1–4.8)
LYMPHOCYTES NFR BLD: 34.7 % (ref 22–41)
MCH RBC QN AUTO: 31.7 PG (ref 27–33)
MCHC RBC AUTO-ENTMCNC: 33.8 G/DL (ref 33.6–35)
MCV RBC AUTO: 93.7 FL (ref 81.4–97.8)
MONOCYTES # BLD AUTO: 0.64 K/UL (ref 0–0.85)
MONOCYTES NFR BLD AUTO: 6.4 % (ref 0–13.4)
NEUTROPHILS # BLD AUTO: 5.41 K/UL (ref 2–7.15)
NEUTROPHILS NFR BLD: 53.8 % (ref 44–72)
NRBC # BLD AUTO: 0 K/UL
NRBC BLD-RTO: 0 /100 WBC
PLATELET # BLD AUTO: 309 K/UL (ref 164–446)
PMV BLD AUTO: 9.9 FL (ref 9–12.9)
POTASSIUM SERPL-SCNC: 3.9 MMOL/L (ref 3.6–5.5)
PROT SERPL-MCNC: 7 G/DL (ref 6–8.2)
RBC # BLD AUTO: 4.92 M/UL (ref 4.2–5.4)
SODIUM SERPL-SCNC: 139 MMOL/L (ref 135–145)
TRIGL SERPL-MCNC: 143 MG/DL (ref 0–149)
WBC # BLD AUTO: 10 K/UL (ref 4.8–10.8)

## 2019-05-09 PROCEDURE — 36415 COLL VENOUS BLD VENIPUNCTURE: CPT

## 2019-05-09 PROCEDURE — 80053 COMPREHEN METABOLIC PANEL: CPT

## 2019-05-09 PROCEDURE — 85025 COMPLETE CBC W/AUTO DIFF WBC: CPT

## 2019-05-09 PROCEDURE — 82150 ASSAY OF AMYLASE: CPT

## 2019-05-09 PROCEDURE — 80061 LIPID PANEL: CPT

## 2019-05-09 PROCEDURE — 83690 ASSAY OF LIPASE: CPT

## 2019-05-13 ENCOUNTER — TELEPHONE (OUTPATIENT)
Dept: MEDICAL GROUP | Facility: PHYSICIAN GROUP | Age: 34
End: 2019-05-13

## 2019-05-13 NOTE — TELEPHONE ENCOUNTER
----- Message from Jennifer Orlando P.A.-C. sent at 5/12/2019  8:44 PM PDT -----  Please inform patient that all lab results came back normal.   Jennifer Orlando P.A.-C.

## 2019-05-13 NOTE — LETTER
Donovan Shrestha  [address] [facility] [address]            5/15/2019        Dear Donovan,      After several attempts, Jennifer Orlando P.A.-C.'s office has been unable to reach you by phone regarding your recent test results.     We ask that you contact us at [phone] at your earliest convenience. Our office hours are from 8:00a - 5:00p Monday thru Friday.    Kind regards,   Mirna Lopez, Med Ass't

## 2020-02-26 ENCOUNTER — OFFICE VISIT (OUTPATIENT)
Dept: MEDICAL GROUP | Facility: PHYSICIAN GROUP | Age: 35
End: 2020-02-26
Payer: COMMERCIAL

## 2020-02-26 VITALS
HEIGHT: 65 IN | TEMPERATURE: 98.9 F | BODY MASS INDEX: 29.82 KG/M2 | DIASTOLIC BLOOD PRESSURE: 90 MMHG | WEIGHT: 179 LBS | HEART RATE: 94 BPM | SYSTOLIC BLOOD PRESSURE: 122 MMHG | OXYGEN SATURATION: 95 %

## 2020-02-26 DIAGNOSIS — Z30.09 BIRTH CONTROL COUNSELING: ICD-10-CM

## 2020-02-26 DIAGNOSIS — N92.0 MENORRHAGIA WITH REGULAR CYCLE: ICD-10-CM

## 2020-02-26 DIAGNOSIS — Z23 NEED FOR VACCINATION: ICD-10-CM

## 2020-02-26 PROCEDURE — 90472 IMMUNIZATION ADMIN EACH ADD: CPT | Performed by: PHYSICIAN ASSISTANT

## 2020-02-26 PROCEDURE — 90732 PPSV23 VACC 2 YRS+ SUBQ/IM: CPT | Performed by: PHYSICIAN ASSISTANT

## 2020-02-26 PROCEDURE — 90715 TDAP VACCINE 7 YRS/> IM: CPT | Performed by: PHYSICIAN ASSISTANT

## 2020-02-26 PROCEDURE — 99214 OFFICE O/P EST MOD 30 MIN: CPT | Mod: 25 | Performed by: PHYSICIAN ASSISTANT

## 2020-02-26 PROCEDURE — 90471 IMMUNIZATION ADMIN: CPT | Performed by: PHYSICIAN ASSISTANT

## 2020-02-26 SDOH — HEALTH STABILITY: MENTAL HEALTH: HOW OFTEN DO YOU HAVE A DRINK CONTAINING ALCOHOL?: MONTHLY OR LESS

## 2020-02-26 SDOH — HEALTH STABILITY: MENTAL HEALTH: HOW OFTEN DO YOU HAVE 6 OR MORE DRINKS ON ONE OCCASION?: LESS THAN MONTHLY

## 2020-02-26 SDOH — HEALTH STABILITY: MENTAL HEALTH: HOW MANY STANDARD DRINKS CONTAINING ALCOHOL DO YOU HAVE ON A TYPICAL DAY?: 1 OR 2

## 2020-02-26 NOTE — NON-PROVIDER
"Donovan Shrestha is a 34 y.o. female here for a non-provider visit for:   PNEUMOVAX (PPSV23)    Reason for immunization: Overdue/Provider Recommended  Immunization records indicate need for vaccine: Yes, confirmed with Epic  Minimum interval has been met for this vaccine: Yes  ABN completed: Not Indicated    Order and dose verified by: VS  VIS Dated  04/24/2015 was given to patient: Yes  All IAC Questionnaire questions were answered \"No.\"    Patient tolerated injection and no adverse effects were observed or reported: Yes    Pt scheduled for next dose in series: Not Indicated    Donovan Shrestha is a 34 y.o. female here for a non-provider visit for:   TDAP    Reason for immunization: Overdue/Provider Recommended  Immunization records indicate need for vaccine: Yes, confirmed with Epic  Minimum interval has been met for this vaccine: Yes  ABN completed: Not Indicated    Order and dose verified by: VS  VIS Dated  02/24/2015 was given to patient: Yes  All IAC Questionnaire questions were answered \"No.\"    Patient tolerated injection and no adverse effects were observed or reported: Yes    Pt scheduled for next dose in series: Not Indicated  "

## 2020-02-26 NOTE — PROGRESS NOTES
"Subjective:   Donovan Shrestha is a 34 y.o. female here today for birth control, heavy menstrual periods. Is an established patient of mine.    HPI:    Patient presents to the office today wishing to discuss birth control.  She was previously prescribed Loestrin Fe but states she only took this for 1 to 2 months and then stopped because she was not finding that she was consistently taking the medication every day.  She is more interested in a long-acting method like either Nexplanon or an IUD.  She mentions that over the last 6 months, she has been having excessively heavy periods to the point where she is changing out pads every hour and waking up 3-4 times a night to change pads.  She denies any excessive cramping associated with the bleeding.  She is not currently established with a gynecologist.  She also wanted to do a Pap smear today but is not due until October.      Current medicines (including changes today)  Current Outpatient Medications   Medication Sig Dispense Refill   • ondansetron (ZOFRAN) 4 MG Tab tablet Take 1 Tab by mouth every 6 hours as needed for Nausea/Vomiting. (Patient not taking: Reported on 2/26/2020) 12 Tab 0     No current facility-administered medications for this visit.      She  has no past medical history on file.    ROS  As per HPI.       Objective:     /90 (BP Location: Left arm, Patient Position: Sitting, BP Cuff Size: Adult)   Pulse 94   Temp 37.2 °C (98.9 °F) (Tympanic)   Ht 1.651 m (5' 5\")   Wt 81.2 kg (179 lb)   SpO2 95%  Body mass index is 29.79 kg/m².     Physical Exam:  Constitutional: Alert, well-appearing, no distress.  Psychiatric: Fully oriented with fluent speech. Affect is appropriate with euthymic mood.  Skin: No rashes in visible areas.  Eye: Pupils are equal and round, conjunctiva clear, lids normal.  ENMT: Lips without lesions, moist mucus membranes.  Neck: Normal-appearing.  Respiratory: Unlabored respiratory effort.      Assessment and Plan:   The " following treatment plan was discussed    1. Birth control counseling  Patient interested in long-acting birth control method. Explained that she will need to see gynecology for this so I have placed referral. Can also discuss her menorrhagia. Hormonal-based method would be best in light of this, but we discussed increased risk due to her smoking (albeit less than estrogen based method). She is light smoker and actively cutting back. Advised complete cessation.  - REFERRAL TO GYNECOLOGY    2. Menorrhagia with regular cycle  New problem, uncontrolled. Having new-onset menorrhagia in the setting of regular periods and no excessive pain/cramping. Likely would improve with hormonal BC. Will be seeing gynecology for further evaluation. In the meantime will check CBC. I have recommended that she start daily iron supplementation.  - REFERRAL TO GYNECOLOGY  - CBC WITH DIFFERENTIAL; Future    3. Need for vaccination  - Pneumococal Polysaccharide Vaccine 23-Valent =>1YO SQ/IM  - Tdap Vaccine =>6YO IM      Followup: Return in about 8 months (around 10/26/2020) for well-woman/pap; Short.    Jennifer Orlando P.A.-C.

## 2020-07-17 ENCOUNTER — OFFICE VISIT (OUTPATIENT)
Dept: MEDICAL GROUP | Facility: PHYSICIAN GROUP | Age: 35
End: 2020-07-17
Payer: COMMERCIAL

## 2020-07-17 VITALS
OXYGEN SATURATION: 98 % | RESPIRATION RATE: 12 BRPM | HEIGHT: 65 IN | BODY MASS INDEX: 30.35 KG/M2 | HEART RATE: 66 BPM | SYSTOLIC BLOOD PRESSURE: 128 MMHG | TEMPERATURE: 98.8 F | WEIGHT: 182.2 LBS | DIASTOLIC BLOOD PRESSURE: 74 MMHG

## 2020-07-17 DIAGNOSIS — B35.4 TINEA CORPORIS: ICD-10-CM

## 2020-07-17 PROCEDURE — 99214 OFFICE O/P EST MOD 30 MIN: CPT | Performed by: PHYSICIAN ASSISTANT

## 2020-07-17 RX ORDER — CLOTRIMAZOLE 1 %
1 CREAM (GRAM) TOPICAL 2 TIMES DAILY
Qty: 15 G | Refills: 0 | Status: SHIPPED
Start: 2020-07-17 | End: 2020-08-12

## 2020-07-17 ASSESSMENT — FIBROSIS 4 INDEX: FIB4 SCORE: 0.41

## 2020-07-17 NOTE — PATIENT INSTRUCTIONS
The 3 providers available to establish care with at this clinic are:  1. Dr. Angelina Alfaro MD  2. Dr. Heaven Ferrer, DO - not currently accepting new patients  3. TANVIR Prado      Body Ringworm  Body ringworm is an infection of the skin that often causes a ring-shaped rash. Body ringworm is also called tinea corporis.  Body ringworm can affect any part of your skin. This condition is easily spread from person to person (is very contagious).  What are the causes?  This condition is caused by fungi called dermatophytes. The condition develops when these fungi grow out of control on the skin.  You can get this condition if you touch a person or animal that has it. You can also get it if you share any items with an infected person or pet. These include:  · Clothing, bedding, and towels.  · Brushes or fagan.  · Gym equipment.  · Any other object that has the fungus on it.  What increases the risk?  You are more likely to develop this condition if you:  · Play sports that involve close physical contact, such as wrestling.  · Sweat a lot.  · Live in areas that are hot and humid.  · Use public showers.  · Have a weakened immune system.  What are the signs or symptoms?  Symptoms of this condition include:  · Itchy, raised red spots and bumps.  · Red scaly patches.  · A ring-shaped rash. The rash may have:  ? A clear center.  ? Scales or red bumps at its center.  ? Redness near its borders.  ? Dry and scaly skin on or around it.  How is this diagnosed?  This condition can usually be diagnosed with a skin exam. A skin scraping may be taken from the affected area and examined under a microscope to see if the fungus is present.  How is this treated?  This condition may be treated with:  · An antifungal cream or ointment.  · An antifungal shampoo.  · Antifungal medicines. These may be prescribed if your ringworm:  ? Is severe.  ? Keeps coming back.  ? Lasts a long time.  Follow these instructions at home:  · Take  over-the-counter and prescription medicines only as told by your health care provider.  · If you were given an antifungal cream or ointment:  ? Use it as told by your health care provider.  ? Wash the infected area and dry it completely before applying the cream or ointment.  · If you were given an antifungal shampoo:  ? Use it as told by your health care provider.  ? Leave the shampoo on your body for 3-5 minutes before rinsing.  · While you have a rash:  ? Wear loose clothing to stop clothes from rubbing and irritating it.  ? Wash or change your bed sheets every night.  ? Disinfect or throw out items that may be infected.  ? Wash clothes and bed sheets in hot water.  ? Wash your hands often with soap and water. If soap and water are not available, use hand .  · If your pet has the same infection, take your pet to see a  for treatment.  How is this prevented?  · Take a bath or shower every day and after every time you work out or play sports.  · Dry your skin completely after bathing.  · Wear sandals or shoes in public places and showers.  · Change your clothes every day.  · Wash athletic clothes after each use.  · Do not share personal items with others.  · Avoid touching red patches of skin on other people.  · Avoid touching pets that have bald spots.  · If you touch an animal that has a bald spot, wash your hands.  Contact a health care provider if:  · Your rash continues to spread after 7 days of treatment.  · Your rash is not gone in 4 weeks.  · The area around your rash gets red, warm, tender, and swollen.  Summary  · Body ringworm is an infection of the skin that often causes a ring-shaped rash.  · This condition is easily spread from person to person (is very contagious).  · This condition may be treated with antifungal cream or ointment, antifungal shampoo, or antifungal medicines.  · Take over-the-counter and prescription medicines only as told by your health care provider.  This  information is not intended to replace advice given to you by your health care provider. Make sure you discuss any questions you have with your health care provider.  Document Released: 12/15/2001 Document Revised: 08/16/2019 Document Reviewed: 08/16/2019  Elsevier Patient Education © 2020 [employer]

## 2020-07-17 NOTE — PROGRESS NOTES
"Subjective:   Donovan Shrestha is a 34 y.o. female here today for breast rash. Is an established patient of mine.    HPI:    Patient presents to the office today with complaints of a rash on her left breast around her nipple.  She states that it started about 2 months ago.  She initially noticed that that area was itchy, and then started noticing a red scaly rash which has been getting worse.  She does complain of some nipple pain, especially with pressure.  Denies any nipple discharge or palpable breast lumps.  She has not tried any over-the-counter creams for the rash.      Current medicines (including changes today)  Current Outpatient Medications   Medication Sig Dispense Refill   • clotrimazole (LOTRIMIN) 1 % Cream Apply 1 Application to affected area(s) 2 times a day. Until rash resolves, 2-4 weeks 15 g 0   • ondansetron (ZOFRAN) 4 MG Tab tablet Take 1 Tab by mouth every 6 hours as needed for Nausea/Vomiting. (Patient not taking: Reported on 2/26/2020) 12 Tab 0     No current facility-administered medications for this visit.      She  has no past medical history on file.    ROS  As per HPI.       Objective:     /74 (BP Location: Right arm, Patient Position: Sitting, BP Cuff Size: Adult)   Pulse 66   Temp 37.1 °C (98.8 °F) (Tympanic)   Resp 12   Ht 1.651 m (5' 5\")   Wt 82.6 kg (182 lb 3.2 oz)   SpO2 98%  Body mass index is 30.32 kg/m².     Physical Exam:  Constitutional: Alert, well-appearing, no distress.  Skin: Warm, dry, good turgor.  There is an oval shaped, erythematous annular rash present just below the inferior border of the areola.  Peripheral scale is noted.  There are also a couple of scattered erythematous, scaly papules on the superior portion of the areola.  Eye: Pupils are equal and round, conjunctiva clear, lids normal.  ENMT: Lips without lesions, moist mucus membranes.  Breast: Focused exam performed to the left breast. +Rash noted as described above.  There are no masses palpated, no " tenderness. No visible nipple discharge. No axillary lymphadenopathy palpated.  Respiratory: Unlabored respiratory effort.    A chaperone was offered to the patient during today's exam. Patient declined chaperone.      Assessment and Plan:   The following treatment plan was discussed    1. Tinea corporis  New problem, uncontrolled.  Explained to patient that rash has the appearance of a tinea infection.  We discussed that treatment of this involves topical application of antifungal cream.  I have prescribed Chlortrimazole with instruction to apply thin layer to the affected areas twice daily until resolution, typically in 2 to 4 weeks.  I have also advised her to also apply over-the-counter hydrocortisone cream twice daily, which will help with the itching and irritation, and also cover eczematous process which is also in the differential.  If no significant improvement with above treatments, should follow-up in the office for reevaluation.  - clotrimazole (LOTRIMIN) 1 % Cream; Apply 1 Application to affected area(s) 2 times a day. Until rash resolves, 2-4 weeks  Dispense: 15 g; Refill: 0      Followup: Return if symptoms worsen or fail to improve.    Jennifer Orlando P.A.-C.

## 2020-08-04 ENCOUNTER — APPOINTMENT (OUTPATIENT)
Dept: RADIOLOGY | Facility: IMAGING CENTER | Age: 35
End: 2020-08-04
Attending: PHYSICIAN ASSISTANT
Payer: COMMERCIAL

## 2020-08-04 ENCOUNTER — OCCUPATIONAL MEDICINE (OUTPATIENT)
Dept: URGENT CARE | Facility: PHYSICIAN GROUP | Age: 35
End: 2020-08-04
Payer: COMMERCIAL

## 2020-08-04 VITALS
DIASTOLIC BLOOD PRESSURE: 74 MMHG | BODY MASS INDEX: 28.93 KG/M2 | OXYGEN SATURATION: 99 % | WEIGHT: 180 LBS | RESPIRATION RATE: 14 BRPM | HEART RATE: 86 BPM | HEIGHT: 66 IN | TEMPERATURE: 98.1 F | SYSTOLIC BLOOD PRESSURE: 106 MMHG

## 2020-08-04 DIAGNOSIS — S90.31XA CONTUSION OF RIGHT FOOT, INITIAL ENCOUNTER: ICD-10-CM

## 2020-08-04 PROCEDURE — 73630 X-RAY EXAM OF FOOT: CPT | Mod: TC,RT | Performed by: PHYSICIAN ASSISTANT

## 2020-08-04 PROCEDURE — 99214 OFFICE O/P EST MOD 30 MIN: CPT | Performed by: PHYSICIAN ASSISTANT

## 2020-08-04 RX ORDER — IBUPROFEN 400 MG/1
400 TABLET ORAL EVERY 6 HOURS PRN
COMMUNITY
End: 2020-10-03

## 2020-08-04 ASSESSMENT — ENCOUNTER SYMPTOMS
SENSORY CHANGE: 0
FOCAL WEAKNESS: 0
TINGLING: 0

## 2020-08-04 ASSESSMENT — FIBROSIS 4 INDEX: FIB4 SCORE: 0.41

## 2020-08-04 NOTE — PROGRESS NOTES
"Subjective:   Donovan Shrestha  is a 34 y.o. female who presents for Work-Related Injury (Right foot injury.  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  Patient was not wearing steel toed shoes.  This occurred approximately 11 AM this morning.  Patient reports pain with weightbearing.  Pain is better with rest.  Patient has not taken anything for this problem.  Denies prior injury.  Does not have a second job.   HPI  Review of Systems   Musculoskeletal:        Right foot pain   Neurological: Negative for tingling, sensory change and focal weakness.   All other systems reviewed and are negative.    Allergies   Allergen Reactions   • Amoxicillin Unspecified     Rxn - seizure   • Penicillins Unspecified     Rxn - seizure     Reviewed past medical, surgical , social and family history.  Reviewed prescription and over-the-counter medications with patient and electronic health record today.     Objective:   /74   Pulse 86   Temp 36.7 °C (98.1 °F) (Temporal)   Resp 14   Ht 1.676 m (5' 6\")   Wt 81.6 kg (180 lb)   LMP 07/21/2020 (Approximate)   SpO2 99%   BMI 29.05 kg/m²   Physical Exam  Vitals signs reviewed.   Constitutional:       Appearance: She is well-developed.   HENT:      Head: Normocephalic and atraumatic.      Right Ear: External ear normal.      Left Ear: External ear normal.      Nose: Nose normal.      Mouth/Throat:      Mouth: Mucous membranes are moist.   Eyes:      Extraocular Movements: Extraocular movements intact.      Conjunctiva/sclera: Conjunctivae normal.      Pupils: Pupils are equal, round, and reactive to light.   Neck:      Musculoskeletal: Normal range of motion and neck supple.   Cardiovascular:      Rate and Rhythm: Normal rate and regular rhythm.      Pulses:           Dorsalis pedis pulses are 2+ on the right side.        Posterior tibial pulses " are 2+ on the right side.      Heart sounds: Normal heart sounds.   Pulmonary:      Effort: Pulmonary effort is normal.      Breath sounds: Normal breath sounds.   Musculoskeletal: Normal range of motion.      Right foot: Normal range of motion and normal capillary refill. Tenderness and bony tenderness present. No swelling, crepitus, deformity or laceration.        Feet:    Lymphadenopathy:      Cervical: No cervical adenopathy.   Skin:     General: Skin is warm and dry.      Findings: No rash.   Neurological:      Mental Status: She is alert and oriented to person, place, and time.      Cranial Nerves: Cranial nerves are intact.      Sensory: Sensation is intact.      Motor: Motor function is intact.      Coordination: Coordination is intact.   Psychiatric:         Attention and Perception: Attention normal.         Mood and Affect: Mood normal.         Speech: Speech normal.         Behavior: Behavior normal.         Thought Content: Thought content normal.         Judgment: Judgment normal.       Right foot: No soft tissue swelling, ecchymosis or deformity noted.  + Tenderness all 5 distal metatarsals.  Distal neurovascular intact.  Dorsalis pedis and posterior tibial  pulses 2+.   Assessment/Plan:   1. Contusion of right foot, initial encounter  - DX-FOOT-COMPLETE 3+ RIGHT; Future    X-rays performed and without fracture.  Patient is given crutches for weightbearing as tolerated.  May use over-the-counter ibuprofen as needed for pain not to exceed recommended daily dose.  Recommend ice and elevation.  Work restrictions as noted above.  Follow-up in 4 days for reevaluation.    [place] D-39 and C-4 completed    Upon entering exam room I ensured patient was wearing a mask.  This provider wore appropriate PPE throughout entire visit.  Patient wore mask entire visit except for a brief period while examining oropharynx.    Differential diagnosis, natural history, supportive care, and indications for immediate follow-up  discussed.     Red flag warning symptoms and strict ER/follow-up precautions given.  The patient demonstrated a good understanding and agreed with the treatment plan.  Please note that this note was created using voice recognition speech to text software. Every effort has been made to correct obvious errors.  However, I expect there are errors of grammar and possibly context that were not discovered prior to finalizing the note  CARLOS MANUEL Glass PA-C

## 2020-08-04 NOTE — LETTER
[facility]  [address] [place] [address]. [address] - [place] [address]  Phone:  [phone] - Fax:  [phone]   [facility] Progress Report and Disability Certification  Date of Service: 2020   No Show:  No  Date / Time of Next Visit: 2020 @9:00 AM   Claim Information   Patient Name: Donovan Shrestha  Claim Number:     Employer: [employer]  Date of Injury: 2020     Insurer / TPA: Braden Kathleen  ID / SSN:     Occupation: [employer]   Diagnosis: The encounter diagnosis was Contusion of right foot, initial encounter.    Medical Information   Related to Industrial Injury? 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  Patient was not wearing steel toed shoes.  This occurred approximately 11 AM this morning.  Patient reports pain with weightbearing.  Pain is better with rest.  Patient has not taken anything for this problem.  Denies prior injury.  Does not have a second job.   Objective Findings: Right foot: No soft tissue swelling, ecchymosis or deformity noted.  + Tenderness all 5 distal metatarsals.  Distal neurovascular intact.  Dorsalis pedis and posterior tibial  pulses 2+.   Pre-Existing Condition(s): None known   Assessment:   Initial Visit    Status: Additional Care Required  Permanent Disability:No    Plan:      Diagnostics: X-ray    Comments:  IMPRESSION:        No acute osseous abnormality.    Disability Information   Status: Released to Restricted Duty    From:  2020  Through: 2020 Restrictions are: Temporary   Physical Restrictions   Sitting:    Standing:  < or = to 2 hrs/day Stooping:    Bending:      Squatting:    Walking:  < or = to 2 hrs/day Climbin hrs/day Pushing:      Pulling:    Other:    Reaching Above Shoulder (L):   Reaching Above Shoulder (R):       Reaching Below Shoulder (L):    Reaching Below  Shoulder (R):      Not to exceed Weight Limits   Carrying(hrs):   Weight Limit(lb): < or = to 10 pounds Lifting(hrs):   Weight  Limit(lb): < or = to 10 pounds   Comments: X-rays performed and without fracture.  Patient is given crutches for weightbearing as tolerated.  May use over-the-counter ibuprofen as needed for pain not to exceed recommended daily dose.  Recommend ice and elevation.  Work restrictions as noted above.  Follow-up in 4 days for reevaluation.    Repetitive Actions   Hands: i.e. Fine Manipulations from Grasping:     Feet: i.e. Operating Foot Controls: 0 hrs/day   Driving / Operate Machinery:     Provider Name:   Tasha Glass P.A.-C. Physician Signature:  Physician Name:     Clinic Name / Location: [address]  [place] [address] Clinic Phone Number: Dept: [phone]   Appointment Time: 2:10 Pm Visit Start Time: 3:35 PM   Check-In Time:  2:24 Pm Visit Discharge Time:  4:53 PM   Original-Treating Physician or Chiropractor    Page 2-Insurer/TPA    Page 3-Employer    Page 4-Employee

## 2020-08-04 NOTE — LETTER
"EMPLOYEE’S CLAIM FOR COMPENSATION/ REPORT OF INITIAL TREATMENT  FORM C-4    EMPLOYEE’S CLAIM - PROVIDE ALL INFORMATION REQUESTED   First Name  Donovan Last Name  Fady Birthdate                    1985                Sex  female Claim Number   Home Address  [address] Age  34 y.o. 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 (if applicable)  Wrapping a pallet    How did this injury or occupational disease occur? (Be specific an answer in detail. Use additional sheet if necessary)  was wrapping a pallet of ammo and ammo fell on my right foot.   If you believe that you have an occupational disease, when did you first have knowledge of the disability and it relationship to your employment?  N/A Witnesses to the Accident  Ranjit Calderon Kt      Nature of Injury or Occupational Disease  Crushing  Part(s) of Body Injured or Affected  Foot (R), N/A, N/A    I certify that the above is true and correct to the best of my knowledge and that I have provided this information in order to obtain the benefits of [place]’s [place] Insurance and " [place]).  I hereby authorize any physician, chiropractor, surgeon, practitioner, or other person, any hospital, including [facility] or [facility] hospital, any medical service organization, any insurance company, or other institution or organization to release to each other, any medical or other information, including benefits paid or payable, pertinent to this injury or disease, except information relative to diagnosis, treatment and/or counseling for AIDS, psychological conditions, alcohol or controlled substances, for which I must give specific authorization.  A Photostat of this authorization shall be as valid as the original.     Date 08/04/2020   [facility]   Employee’s Signature   THIS REPORT MUST BE COMPLETED AND MAILED WITHIN 3 WORKING DAYS OF TREATMENT   Place  [facility]  Name of Facility  [place]   Date  8/4/2020 Diagnosis  (S90.31XA) Contusion of right foot, initial encounter Is there evidence the injured employee was under the              influence of alcohol and/or another controlled substance at the time of accident?   Hour  3:35 PM Description of Injury or Disease  The encounter diagnosis was Contusion of right foot, initial encounter. No   Treatment  X-rays performed and without fracture.  Patient is given crutches for weightbearing as tolerated.  May use over-the-counter ibuprofen as needed for pain not to exceed recommended daily dose.  Recommend ice and elevation.  Work restrictions as noted a on [place] D-[place] follow-up in 4 days for reevaluation.  Have you advised the patient to remain off work five days or     more? No   X-Ray Findings  Negative   If Yes   From Date  To Date      From information given by the employee, together with medical evidence, can you directly connect this injury or occupational disease as job incurred?  Yes If No Full Duty    No Modified Duty  Yes   Is  "additional medical care by a physician indicated?  Yes If Modified Duty, Specify any Limitations / Restrictions  Please see [place] D-39  Crutches, weight bearing as tolerated   Do you know of any previous injury or disease contributing to this condition or occupational disease?                            No   Date  8/4/2020 Print Doctor’s Name   Tasha Glass P.A.-C. I certify the employer’s copy of  this form was mailed on:   Address  [address] [place] [address]. [address] Insurer’s Use Only     [facility] Zip  [address]    Provider’s Tax ID Number  [id number] Telephone  Dept: [phone]      e-TASHA Shaver P.A.-C.  Signature:     Degree          ORIGINAL-TREATING PHYSICIAN OR CHIROPRACTOR    PAGE 2-INSURER/TPA    PAGE 3-EMPLOYER    PAGE 4-EMPLOYEE        Form C-4 (rev.10/07)          BRIEF DESCRIPTION OF RIGHTS AND BENEFITS  (Pursuant to [place] 616C.050)    Notice of Injury or Occupational Disease (Incident Report Form C-1): If an injury or occupational disease (OD) arises out of and in the course of employment, you must provide written notice to your employer as soon as practicable, but no later than 7 days after the accident or OD. Your employer shall maintain a sufficient supply of the required forms.     Claim for Compensation (Form C-4): If medical treatment is sought, the form C-4 is available at the place of initial treatment. A completed \"Claim for Compensation\" (Form C-4) must be filed within 90 days after an accident or OD. The treating physician or chiropractor must, within 3 working days after treatment, complete and mail to the employer, the employer's insurer and third-party , the Claim for Compensation.     Medical Treatment: If you require medical treatment for your on-the-job injury or OD, you may be required to select a physician or chiropractor from a list provided by your workers’ compensation insurer, if it has contracted with an Organization for Managed Care (MCO) or " Preferred Provider Organization (PPO) or providers of health care. If your employer has not entered into a contract with an MCO or PPO, you may select a physician or chiropractor from the Panel of Physicians and Chiropractors. Any medical costs related to your industrial injury or OD will be paid by your insurer.     Temporary Total Disability (TTD): If your doctor has certified that you are unable to work for a period of at least 5 consecutive days, or 5 cumulative days in a 20-day period, or places restrictions on you that your employer does not accommodate, you may be entitled to TTD compensation.     Temporary Partial Disability (TPD): If the wage you receive upon reemployment is less than the compensation for TTD to which you are entitled, the insurer may be required to pay you TPD compensation to make up the difference. TPD can only be paid for a maximum of 24 months.     Permanent Partial Disability (PPD): When your medical condition is stable and there is an indication of a PPD as a result of your injury or OD, within 30 days, your insurer must arrange for an evaluation by a rating physician or chiropractor to determine the degree of your PPD. The amount of your PPD award depends on the date of injury, the results of the PPD evaluation and your age and wage.     Permanent Total Disability (PTD): If you are medically certified by a treating physician or chiropractor as permanently and totally disabled and have been granted a PTD status by your insurer, you are entitled to receive monthly benefits not to exceed 66 2/3% of your average monthly wage. The amount of your PTD payments is subject to reduction if you previously received a PPD award.     Vocational Rehabilitation Services: You may be eligible for vocational rehabilitation services if you are unable to return to the job due to a permanent physical impairment or permanent restrictions as a result of your injury or occupational disease.          Transportation and Per [place] Reimbursement: You may be eligible for travel expenses and per bayron associated with medical treatment.     Reopening: You may be able to reopen your claim if your condition worsens after claim closure.     Appeal Process: If you disagree with a written determination issued by the insurer or the insurer does not respond to your request, you may appeal to the Department of Administration, , by following the instructions contained in your determination letter. You must appeal the determination within 70 days from the date of the determination letter at [address], [address], [place], [place] [address], or [address], [address], [place], [place] [address]. If you disagree with the  decision, you may appeal to the Department of Administration, . You must file your appeal within 30 days from the date of the  decision letter at [address], [address], [place], [place] [address], or [address], [place], [place] [address]. If you disagree with a decision of an , you may file a petition for judicial review with the District Court. You must do so within 30 days of the Appeal Officer’s decision. You may be represented by an  at your own expense or you may contact the [facility] for possible representation.     [place]  for Injured Workers (NAIW): If you disagree with a  decision, you may request that NAIW represent you without charge at an  Hearing. For information regarding denial of benefits, you may contact the [facility] at: [address], [address], [place] [address], [phone]) [phone], or [address], [place] [address], [phone]     To File a Complaint with the Division: If you wish to file a complaint with the  of the Division of Industrial Relations (DIR), please contact the Workers’  Compensation Section, [address], [address], [place], [place] [address], telephone ([phone]) [phone], or [address], [address], [place], [place] [address], telephone [phone]) [phone].     For assistance with Workers’ Compensation Issues: You may contact the Office of the Governor Consumer Health Assistance, [address] [place] [address], [address], [place], [place] [address], Toll Free 1-276.137.3451, Web site: http://govcha.[facility].nv., E-mail [email or web address].              __________________________________________________________________                              ___________________         Employee Name / Signature                                                                                                                     Date                                                                                                                                                                                       D-2 (rev. 01/20)

## 2020-08-04 NOTE — PATIENT INSTRUCTIONS
Contusion  A contusion is a deep bruise. This is a result of an injury that causes bleeding under the skin. Symptoms of bruising include pain, swelling, and discolored skin. The skin may turn blue, purple, or yellow.  Follow these instructions at home:  Managing pain, stiffness, and swelling  You may use RICE. This stands for:  · Resting.  · Icing.  · Compression, or putting pressure.  · Elevating, or raising the injured area.  To follow this method, do these actions:  · Rest the injured area.  · If told, put ice on the injured area.  ? Put ice in a plastic bag.  ? Place a towel between your skin and the bag.  ? Leave the ice on for 20 minutes, 2-3 times per day.  · If told, put light pressure (compression) on the injured area using an elastic bandage. Make sure the bandage is not too tight. If the area tingles or becomes numb, remove it and put it back on as told by your doctor.  · If possible, raise (elevate) the injured area above the level of your heart while you are sitting or lying down.    General instructions  · Take over-the-counter and prescription medicines only as told by your doctor.  · Keep all follow-up visits as told by your doctor. This is important.  Contact a doctor if:  · Your symptoms do not get better after several days of treatment.  · Your symptoms get worse.  · You have trouble moving the injured area.  Get help right away if:  · You have very bad pain.  · You have a loss of feeling (numbness) in a hand or foot.  · Your hand or foot turns pale or cold.  Summary  · A contusion is a deep bruise. This is a result of an injury that causes bleeding under the skin.  · Symptoms of bruising include pain, swelling, and discolored skin. The skin may turn blue, purple, or yellow.  · This condition is treated with rest, ice, compression, and elevation. This is also called RICE. You may be given over-the-counter medicines for pain.  · Contact a doctor if you do not feel better, or you feel worse. Get  help right away if you have very bad pain, have lost feeling in a hand or foot, or the area turns pale or cold.  This information is not intended to replace advice given to you by your health care provider. Make sure you discuss any questions you have with your health care provider.  Document Released: 06/05/2009 Document Revised: 08/09/2019 Document Reviewed: 08/09/2019  Elsevier Patient Education © 2020 Elsevier Inc.

## 2020-08-08 ENCOUNTER — OCCUPATIONAL MEDICINE (OUTPATIENT)
Dept: URGENT CARE | Facility: PHYSICIAN GROUP | Age: 35
End: 2020-08-08
Payer: COMMERCIAL

## 2020-08-08 VITALS
RESPIRATION RATE: 16 BRPM | HEART RATE: 72 BPM | OXYGEN SATURATION: 97 % | TEMPERATURE: 98.3 F | DIASTOLIC BLOOD PRESSURE: 62 MMHG | BODY MASS INDEX: 28.93 KG/M2 | SYSTOLIC BLOOD PRESSURE: 110 MMHG | WEIGHT: 180 LBS | HEIGHT: 66 IN

## 2020-08-08 DIAGNOSIS — S90.31XD CONTUSION OF RIGHT FOOT, SUBSEQUENT ENCOUNTER: ICD-10-CM

## 2020-08-08 PROCEDURE — 99213 OFFICE O/P EST LOW 20 MIN: CPT | Performed by: PHYSICIAN ASSISTANT

## 2020-08-08 ASSESSMENT — FIBROSIS 4 INDEX: FIB4 SCORE: 0.41

## 2020-08-08 ASSESSMENT — PAIN SCALES - GENERAL: PAINLEVEL: 2=MINIMAL-SLIGHT

## 2020-08-08 NOTE — LETTER
"   [facility]  [address] [place] [address]. [address] - [place] [address]  Phone:  [phone] - Fax:  [phone]   [facility] Progress Report and Disability Certification  Date of Service: 8/8/2020   No Show:  No  Date / Time of Next Visit: 8/12/2020@12:00PM   Claim Information   Patient Name: Donovan Shrestha  Claim Number:     Employer: [employer]  Date of Injury: 8/4/2020     Insurer / TPA: Braden Kathleen  ID / SSN:     Occupation: Warehouse   Diagnosis: The encounter diagnosis was Contusion of right foot, subsequent encounter.    Medical Information   Related to Industrial Injury? Yes    Subjective Complaints:  DOI: 8/4/20  Patient notes on date of injury she was \"wrapping a palate of animal when animal fell on right foot\".  She returns to clinic today stating: Has had significant improvement over interim.  She approximates 90% improved at this time.  She has been compliant with restrictions and using OTC NSAIDs as well as icing.  She would like to trial full duty and anticipates closing case shortly thereafter.   Objective Findings: Gen: AOx3; Head: NC AT; Eyes: PERRLA/EOM; Lungs: NLR; Cardiac: RR by periph pulse exam; right foot: Grossly normal no erythema ecchymosis or edema, full active range of motion, normal strength testing, no bony tenderness to palpation; neuro: N VID, normal sensation light touch, nonantalgic gait, +2 dorsalis pedis pulse   Pre-Existing Condition(s):     Assessment:   Condition Improved    Status: Additional Care Required  Permanent Disability:No    Plan:   Comments:Trial full duty, OTC NSAIDs, ice, elevation, follow-up in 4 days for likely MMI      Diagnostics:      Comments:  Trial full duty, OTC NSAIDs, ice, elevation, follow-up in 4 days for likely MMI    Disability Information   Status: Released to Full Duty    From:  8/8/2020  Through: 8/12/2020 Restrictions are:     Physical Restrictions   Sitting:    Standing:    " Stooping:    Bending:      Squatting:    Walking:    Climbing:    Pushing:      Pulling:    Other:    Reaching Above Shoulder (L):   Reaching Above Shoulder (R):       Reaching Below Shoulder (L):    Reaching Below Shoulder (R):      Not to exceed Weight Limits   Carrying(hrs):   Weight Limit(lb):   Lifting(hrs):   Weight  Limit(lb):     Comments: Trial full duty, OTC NSAIDs, ice, elevation, follow-up in 4 days for likely MMI    Repetitive Actions   Hands: i.e. Fine Manipulations from Grasping:     Feet: i.e. Operating Foot Controls:     Driving / Operate Machinery:     Provider Name:   J Carlos Baires P.A.-C. Physician Signature:  Physician Name:     Clinic Name / Location: [address]  [place] [address] Clinic Phone Number: Dept: [phone]   Appointment Time: 9:05 Am Visit Start Time: 9:09 AM   Check-In Time:  9:03 Am Visit Discharge Time:  9:40AM   Original-Treating Physician or Chiropractor    Page 2-Insurer/TPA    Page 3-Employer    Page 4-Employee

## 2020-08-08 NOTE — PROGRESS NOTES
"Subjective:     Donovan Shrestha is a 34 y.o. female who presents for No chief complaint on file.      DOI: 8/4/20  Patient notes on date of injury she was \"wrapping a palate of animal when animal fell on right foot\".  She returns to clinic today stating: Has had significant improvement over interim.  She approximates 90% improved at this time.  She has been compliant with restrictions and using OTC NSAIDs as well as icing.  She would like to trial full duty and anticipates closing case shortly thereafter.    PMH:   No pertinent past medical history to this problem  MEDS:  Medications were reviewed in EMR  ALLERGIES:  Allergies were reviewed in EMR  FH:   No pertinent family history to this problem       Objective:     /62 (BP Location: Right arm, Patient Position: Sitting, BP Cuff Size: Adult)   Pulse 72   Temp 36.8 °C (98.3 °F) (Tympanic)   Resp 16   Ht 1.676 m (5' 6\")   Wt 81.6 kg (180 lb)   LMP 07/21/2020 (Approximate)   SpO2 97%   BMI 29.05 kg/m²     Gen: AOx3; Head: NC AT; Eyes: PERRLA/EOM; Lungs: NLR; Cardiac: RR by periph pulse exam; right foot: Grossly normal no erythema ecchymosis or edema, full active range of motion, normal strength testing, no bony tenderness to palpation; neuro: N VID, normal sensation light touch, nonantalgic gait, +2 dorsalis pedis pulse    Assessment/Plan:       1. Contusion of right foot, subsequent encounter    • Released to Full Duty FROM 8/8/2020 TO 8/12/2020  • Trial full duty, OTC NSAIDs, ice, elevation, follow-up in 4 days for likely MMI  • Trial full duty, OTC NSAIDs, ice, elevation, follow-up in 4 days for likely MMI    Differential diagnosis, natural history, supportive care, and indications for immediate follow-up discussed.  "

## 2020-08-12 ENCOUNTER — OCCUPATIONAL MEDICINE (OUTPATIENT)
Dept: URGENT CARE | Facility: PHYSICIAN GROUP | Age: 35
End: 2020-08-12
Payer: COMMERCIAL

## 2020-08-12 VITALS
TEMPERATURE: 97.8 F | WEIGHT: 180 LBS | SYSTOLIC BLOOD PRESSURE: 110 MMHG | HEIGHT: 66 IN | RESPIRATION RATE: 16 BRPM | HEART RATE: 74 BPM | BODY MASS INDEX: 28.93 KG/M2 | DIASTOLIC BLOOD PRESSURE: 78 MMHG | OXYGEN SATURATION: 96 %

## 2020-08-12 DIAGNOSIS — S90.31XD CONTUSION OF RIGHT FOOT, SUBSEQUENT ENCOUNTER: ICD-10-CM

## 2020-08-12 PROCEDURE — 99213 OFFICE O/P EST LOW 20 MIN: CPT | Performed by: FAMILY MEDICINE

## 2020-08-12 ASSESSMENT — FIBROSIS 4 INDEX: FIB4 SCORE: 0.41

## 2020-08-12 NOTE — PROGRESS NOTES
"Subjective:      Donovan Shrestha is a 34 y.o. female who presents with Follow-Up (Work injury - Right foot strain.  )      DOI: 8/4/20  Patient is here for follow-up foot contusion.  Doing very well on fall duty.  No other complaints.             ROS       Objective:     /78   Pulse 74   Temp 36.6 °C (97.8 °F)   Resp 16   Ht 1.676 m (5' 6\")   Wt 81.6 kg (180 lb)   LMP 07/21/2020 (Approximate)   SpO2 96%   BMI 29.05 kg/m²      Physical Exam    In no acute distress  Full range of motion right foot and ankle, neurovascular intact.  No tenderness.  No erythema or bruising.       Assessment/Plan:        1. Contusion of right foot, subsequent encounter    Maximum medical improvement  Released to full duty permanently  Follow-up PRN    "

## 2020-08-12 NOTE — LETTER
[address] [place] [address]  Phone:  [phone] - Fax:  [phone]   [facility] Progress Report and Disability Certification  Date of Service: 8/12/2020   No Show:  No  Date / Time of Next Visit:     Claim Information   Patient Name: Donovan Shrestha  Claim Number:     Employer: [employer]  Date of Injury: 8/4/2020     Insurer / TPA: Braden Kathleen  ID / SSN:     Occupation: [employer]   Diagnosis: The encounter diagnosis was Contusion of right foot, subsequent encounter.    Medical Information   Related to Industrial Injury?      Subjective Complaints:  DOI: 8/4/20  Patient is here for follow-up foot contusion.  Doing very well on fall duty.  No other complaints.   Objective Findings: In no acute distress  Full range of motion right foot and ankle, neurovascular intact.  No tenderness.  No erythema or bruising.   Pre-Existing Condition(s):     Assessment:   Condition Improved    Status: Discharged /  MMI  Permanent Disability:     Plan:      Diagnostics:      Comments:       Disability Information   Status: Released to Full Duty    From:     Through:   Restrictions are: Permanent   Physical Restrictions   Sitting:    Standing:    Stooping:    Bending:      Squatting:    Walking:    Climbing:    Pushing:      Pulling:    Other:    Reaching Above Shoulder (L):   Reaching Above Shoulder (R):       Reaching Below Shoulder (L):    Reaching Below Shoulder (R):      Not to exceed Weight Limits   Carrying(hrs):   Weight Limit(lb):   Lifting(hrs):   Weight  Limit(lb):     Comments:      Repetitive Actions   Hands: i.e. Fine Manipulations from Grasping:     Feet: i.e. Operating Foot Controls:     Driving / Operate Machinery:     Provider Name:   Hali Bender M.D. Physician Signature:  Physician Name:     Clinic Name / Location: [address]  [place] [address] Clinic Phone  Number: Dept: [phone]   Appointment Time: 12:00 Pm Visit Start Time: 11:56 AM   Check-In Time:  11:49 Am Visit Discharge Time: 12:31 PM   Original-Treating Physician or Chiropractor    Page 2-Insurer/TPA    Page 3-Employer    Page 4-Employee

## 2020-10-03 ENCOUNTER — APPOINTMENT (OUTPATIENT)
Dept: RADIOLOGY | Facility: MEDICAL CENTER | Age: 35
End: 2020-10-03
Attending: EMERGENCY MEDICINE
Payer: COMMERCIAL

## 2020-10-03 ENCOUNTER — HOSPITAL ENCOUNTER (EMERGENCY)
Facility: MEDICAL CENTER | Age: 35
End: 2020-10-04
Attending: EMERGENCY MEDICINE
Payer: COMMERCIAL

## 2020-10-03 VITALS
HEART RATE: 105 BPM | DIASTOLIC BLOOD PRESSURE: 93 MMHG | WEIGHT: 180.78 LBS | SYSTOLIC BLOOD PRESSURE: 133 MMHG | RESPIRATION RATE: 16 BRPM | TEMPERATURE: 98.3 F | OXYGEN SATURATION: 96 % | HEIGHT: 65 IN | BODY MASS INDEX: 30.12 KG/M2

## 2020-10-03 DIAGNOSIS — S60.221A CONTUSION OF RIGHT HAND, INITIAL ENCOUNTER: ICD-10-CM

## 2020-10-03 DIAGNOSIS — M79.641 RIGHT HAND PAIN: ICD-10-CM

## 2020-10-03 PROCEDURE — 302874 HCHG BANDAGE ACE 2 OR 3""

## 2020-10-03 PROCEDURE — 73130 X-RAY EXAM OF HAND: CPT | Mod: RT

## 2020-10-03 PROCEDURE — 99284 EMERGENCY DEPT VISIT MOD MDM: CPT

## 2020-10-03 PROCEDURE — A9270 NON-COVERED ITEM OR SERVICE: HCPCS | Performed by: EMERGENCY MEDICINE

## 2020-10-03 PROCEDURE — 700102 HCHG RX REV CODE 250 W/ 637 OVERRIDE(OP): Performed by: EMERGENCY MEDICINE

## 2020-10-03 RX ORDER — NAPROXEN 250 MG/1
500 TABLET ORAL ONCE
Status: COMPLETED | OUTPATIENT
Start: 2020-10-03 | End: 2020-10-03

## 2020-10-03 RX ORDER — ACETAMINOPHEN 325 MG/1
650 TABLET ORAL ONCE
Status: COMPLETED | OUTPATIENT
Start: 2020-10-03 | End: 2020-10-03

## 2020-10-03 RX ADMIN — NAPROXEN 500 MG: 250 TABLET ORAL at 23:42

## 2020-10-03 RX ADMIN — ACETAMINOPHEN 650 MG: 325 TABLET, FILM COATED ORAL at 23:42

## 2020-10-03 ASSESSMENT — FIBROSIS 4 INDEX: FIB4 SCORE: 0.41

## 2020-10-03 NOTE — Clinical Note
Donovan Shrestha was seen and treated in our emergency department on 10/3/2020.  She may return to work on 10/07/2020.       If you have any questions or concerns, please don't hesitate to call.      Sara Hines M.D.

## 2020-10-04 ASSESSMENT — ENCOUNTER SYMPTOMS: SENSORY CHANGE: 0

## 2020-10-04 NOTE — ED PROVIDER NOTES
ED Provider Note  Primary care provider: Jennifer Orlando P.A.-C.  Means of arrival: private vehicl  History obtained from: patient  History limited by: none    CHIEF COMPLAINT  Chief Complaint   Patient presents with   • T-5000 Extremity Pain     Punched a wall 45 min ago injured RT hand       HPI  Donovan Shrestha is a 34 y.o. female who presents to the Emergency Department for right hand pain.  Patient reports just prior to arrival she punched a wall with her right hand as she was upset.  She subsequently sustained abrasion to her right knuckles between the fourth and fifth digits and is having pain at the base of the fourth and fifth digits.  She reports the pain is moderate and throbbing.  Denies numbness, tingling or weakness.  Reports decreased range of motion in her fourth and fifth digits secondary to pain and swelling.  Patient is right-hand dominant.     REVIEW OF SYSTEMS  Review of Systems   Musculoskeletal:        Right hand pain   Neurological: Negative for sensory change.         PAST MEDICAL HISTORY   none    SURGICAL HISTORY   has a past surgical history that includes tonsillectomy.    SOCIAL HISTORY  Social History     Tobacco Use   • Smoking status: Current Every Day Smoker     Packs/day: 0.25     Years: 10.00     Pack years: 2.50     Types: Cigarettes   • Smokeless tobacco: Never Used   • Tobacco comment: currently smoking about 2-3 cigs/day   Substance Use Topics   • Alcohol use: Yes     Frequency: Monthly or less     Drinks per session: 1 or 2     Binge frequency: Less than monthly     Comment: occasional    • Drug use: No      Social History     Substance and Sexual Activity   Drug Use No       FAMILY HISTORY  Family History   Problem Relation Age of Onset   • Cancer Father    • Prostate cancer Father    • Hypertension Brother    • Hypertension Paternal Aunt    • Hypertension Paternal Uncle    • Cancer Maternal Grandmother    • Breast Cancer Maternal Grandmother    • Cancer Maternal Grandfather   "       colon cancer   • Cancer Paternal Grandmother         breast cancer   • Hypertension Paternal Grandmother    • Breast Cancer Paternal Grandmother    • Hypertension Paternal Grandfather        CURRENT MEDICATIONS  Home Medications     Reviewed by Juana Reeves R.N. (Registered Nurse) on 10/03/20 at 2235  Med List Status: Complete   Medication Last Dose Status        Patient Ray Taking any Medications                       ALLERGIES  Allergies   Allergen Reactions   • Amoxicillin Unspecified     Rxn - seizure   • Penicillins Unspecified     Rxn - seizure       PHYSICAL EXAM  VITAL SIGNS: /93   Pulse (!) 105   Temp 36.8 °C (98.3 °F) (Temporal)   Resp 16   Ht 1.651 m (5' 5\")   Wt 82 kg (180 lb 12.4 oz)   LMP 09/05/2020 (Approximate)   SpO2 96%   Breastfeeding No   BMI 30.08 kg/m²   Vitals reviewed by myself.  Physical Exam   Constitutional:   Appears uncomfortable   Neck: Normal range of motion.   Cardiovascular: Normal rate.   2+ bilateral radial pulses, brisk capillary refill noted in all distal fingertips   Pulmonary/Chest: Effort normal.   Musculoskeletal:      Comments: Decreased range of motion in the right hand at the MCP joints secondary to pain.  Patient is noted to have mild swelling and tenderness palpation over the right fourth and fifth MCP joints.  Associated abrasion over the right fourth and fifth knuckles.  Sensation is intact in all distal fingertips.  Patient has full range of motion of her wrist without any pain to palpation.   Neurological:   Sensation intact in all distal fingertips         DIAGNOSTIC STUDIES /  LABS      RADIOLOGY  DX-HAND 3+ RIGHT   Final Result      No acute osseous abnormality.      If pain persists, repeat radiographs in 7-10 days is recommended.        The radiologist's interpretation of all radiological studies have been reviewed by me.      COURSE & MEDICAL DECISION MAKING  Nursing notes, VS, PMSFHx reviewed in chart.    Patient is a 34-year-old " female who comes in for evaluation of right hand pain.  Differential diagnosis include sprain, strain, fracture.  Diagnostic work-up includes right hand x-ray.    Patient's initial vitals notable for slight tachycardia, likely secondary to discomfort.  She is otherwise neurovascularly intact.  Patient is treated with naproxen and Tylenol after which she feels improved.  Imaging returns demonstrates no acute osseous abnormalities.  Therefore patient is wrapped with Ace wrap and advised on symptomatic management of pain at home.  She is given strict return precautions and discharged in stable condition.      FINAL IMPRESSION  1. Contusion of right hand, initial encounter    2. Right hand pain

## 2021-04-18 ENCOUNTER — OFFICE VISIT (OUTPATIENT)
Dept: URGENT CARE | Facility: PHYSICIAN GROUP | Age: 36
End: 2021-04-18
Payer: COMMERCIAL

## 2021-04-18 VITALS
WEIGHT: 185 LBS | BODY MASS INDEX: 29.73 KG/M2 | HEIGHT: 66 IN | TEMPERATURE: 98.2 F | SYSTOLIC BLOOD PRESSURE: 122 MMHG | HEART RATE: 72 BPM | OXYGEN SATURATION: 97 % | RESPIRATION RATE: 14 BRPM | DIASTOLIC BLOOD PRESSURE: 84 MMHG

## 2021-04-18 DIAGNOSIS — M25.521 RIGHT ELBOW PAIN: ICD-10-CM

## 2021-04-18 DIAGNOSIS — M79.2 RADICULAR PAIN IN RIGHT ARM: ICD-10-CM

## 2021-04-18 PROCEDURE — 99213 OFFICE O/P EST LOW 20 MIN: CPT | Performed by: NURSE PRACTITIONER

## 2021-04-18 RX ORDER — IBUPROFEN 200 MG
200 TABLET ORAL EVERY 6 HOURS PRN
COMMUNITY
End: 2024-01-09

## 2021-04-18 RX ORDER — METHYLPREDNISOLONE 4 MG/1
TABLET ORAL
Qty: 21 TABLET | Refills: 0 | Status: SHIPPED | OUTPATIENT
Start: 2021-04-18 | End: 2024-01-09

## 2021-04-18 RX ORDER — ACETAMINOPHEN 325 MG/1
650 TABLET ORAL EVERY 4 HOURS PRN
COMMUNITY
End: 2024-01-09

## 2021-04-18 ASSESSMENT — FIBROSIS 4 INDEX: FIB4 SCORE: 0.42

## 2021-04-18 ASSESSMENT — PAIN SCALES - GENERAL: PAINLEVEL: 4=SLIGHT-MODERATE PAIN

## 2021-04-18 NOTE — PROGRESS NOTES
Subjective:      Donovan Shrestha is a 35 y.o. female who presents with Elbow Problem (Right elbow and shoulder pain x 1 week denies injury.)            Arm Pain   Incident onset: pt reports new onset of right arm pain that started 6 days ago. She denies any injury. She thinks she slept incorrectly which caused the discomfort. Denies anything like this before. There was no injury mechanism. The pain is present in the right elbow. Radiates to: right shoulder. Associated symptoms comments: Does report some mild intermittent tingling and feeling of hand falling asleep for a few episodes. Exacerbated by: bending right elbow. She has tried NSAIDs, acetaminophen and heat (topical icy hot) for the symptoms. The treatment provided no relief.       Review of Systems   Musculoskeletal: Positive for joint pain (right elbow).   All other systems reviewed and are negative.    History reviewed. No pertinent past medical history.   Past Surgical History:   Procedure Laterality Date   • TONSILLECTOMY        Social History     Socioeconomic History   • Marital status: Single     Spouse name: Not on file   • Number of children: Not on file   • Years of education: Not on file   • Highest education level: Not on file   Occupational History   • Not on file   Tobacco Use   • Smoking status: Current Every Day Smoker     Packs/day: 0.25     Years: 10.00     Pack years: 2.50     Types: Cigarettes   • Smokeless tobacco: Never Used   • Tobacco comment: currently smoking about 2-3 cigs/day   Substance and Sexual Activity   • Alcohol use: Yes     Comment: occasional    • Drug use: No   • Sexual activity: Yes     Partners: Male   Other Topics Concern   • Not on file   Social History Narrative   • Not on file     Social Determinants of Health     Financial Resource Strain:    • Difficulty of Paying Living Expenses:    Food Insecurity:    • Worried About Running Out of Food in the Last Year:    • Ran Out of Food in the Last Year:    Transportation  "Needs:    • Lack of Transportation (Medical):    • Lack of Transportation (Non-Medical):    Physical Activity:    • Days of Exercise per Week:    • Minutes of Exercise per Session:    Stress:    • Feeling of Stress :    Social Connections:    • Frequency of Communication with Friends and Family:    • Frequency of Social Gatherings with Friends and Family:    • Attends Islam Services:    • Active Member of Clubs or Organizations:    • Attends Club or Organization Meetings:    • Marital Status:    Intimate Partner Violence:    • Fear of Current or Ex-Partner:    • Emotionally Abused:    • Physically Abused:    • Sexually Abused:           Objective:     /84   Pulse 72   Temp 36.8 °C (98.2 °F) (Temporal)   Resp 14   Ht 1.676 m (5' 6\")   Wt 83.9 kg (185 lb)   LMP 04/18/2021 (Exact Date)   SpO2 97%   BMI 29.86 kg/m²      Physical Exam  Vitals and nursing note reviewed.   Constitutional:       Appearance: Normal appearance. She is normal weight.   HENT:      Head: Normocephalic and atraumatic.      Nose: Nose normal.      Mouth/Throat:      Mouth: Mucous membranes are moist.      Pharynx: Oropharynx is clear.   Eyes:      Extraocular Movements: Extraocular movements intact.      Pupils: Pupils are equal, round, and reactive to light.   Cardiovascular:      Rate and Rhythm: Normal rate and regular rhythm.   Pulmonary:      Effort: Pulmonary effort is normal.   Musculoskeletal:         General: Normal range of motion.      Right elbow: No swelling, deformity or effusion. Normal range of motion.        Arms:       Cervical back: Normal range of motion.   Skin:     General: Skin is warm and dry.      Capillary Refill: Capillary refill takes less than 2 seconds.   Neurological:      General: No focal deficit present.      Mental Status: She is alert and oriented to person, place, and time. Mental status is at baseline.   Psychiatric:         Mood and Affect: Mood normal.         Speech: Speech normal.         " Thought Content: Thought content normal.         Judgment: Judgment normal.                 Assessment/Plan:        1. Right elbow pain    2. Radicular pain in right arm  - methylPREDNISolone (MEDROL DOSEPAK) 4 MG Tablet Therapy Pack; Follow schedule on package instructions.  Dispense: 21 tablet; Refill: 0    Discussed with patient her symptoms sound c/w nerve impingement syndrome  Limit repetitive motions  Will provide her with sling for support that she can wear as needed  Encouraged her to continue using ibuprofen TID for another 3 days, then stop using regularly and use intermittently  Continue tylenol as needed for additional pain relief  Ice compresses  RTC in one week if not improving  Work note provided  Supportive care, differential diagnoses, and indications for immediate follow-up discussed with patient.    Pathogenesis of diagnosis discussed including typical length and natural progression.      Instructed to return to  or nearest emergency department if symptoms fail to improve, for any change in condition, further concerns, or new concerning symptoms.  Patient states understanding of the plan of care and discharge instructions.

## 2021-04-18 NOTE — LETTER
2021    To Whom It May Concern:         This is confirmation that Donovan Shrestha attended her scheduled appointment with RADHIKA San on 21.    Please allow her to be on light duty 21-21. She needs to wear her sling at work. She cannot lift greater than 10 lbs with the right arm. These restrictions will  21. She will return to full duty 21.    Sincerely,          RICKY San.  [phone]

## 2022-03-17 ENCOUNTER — OFFICE VISIT (OUTPATIENT)
Dept: URGENT CARE | Facility: PHYSICIAN GROUP | Age: 37
End: 2022-03-17
Payer: COMMERCIAL

## 2022-03-17 ENCOUNTER — APPOINTMENT (OUTPATIENT)
Dept: RADIOLOGY | Facility: IMAGING CENTER | Age: 37
End: 2022-03-17
Payer: COMMERCIAL

## 2022-03-17 VITALS
OXYGEN SATURATION: 97 % | DIASTOLIC BLOOD PRESSURE: 72 MMHG | SYSTOLIC BLOOD PRESSURE: 138 MMHG | HEIGHT: 65 IN | RESPIRATION RATE: 12 BRPM | HEART RATE: 88 BPM | BODY MASS INDEX: 32.49 KG/M2 | WEIGHT: 195 LBS | TEMPERATURE: 99.2 F

## 2022-03-17 DIAGNOSIS — J06.9 URTI (ACUTE UPPER RESPIRATORY INFECTION): ICD-10-CM

## 2022-03-17 DIAGNOSIS — R05.9 COUGH: ICD-10-CM

## 2022-03-17 DIAGNOSIS — R07.89 CHEST TIGHTNESS: ICD-10-CM

## 2022-03-17 DIAGNOSIS — R07.89 CHEST HEAVINESS: ICD-10-CM

## 2022-03-17 PROCEDURE — 93000 ELECTROCARDIOGRAM COMPLETE: CPT

## 2022-03-17 PROCEDURE — 71046 X-RAY EXAM CHEST 2 VIEWS: CPT | Mod: TC

## 2022-03-17 PROCEDURE — 99214 OFFICE O/P EST MOD 30 MIN: CPT

## 2022-03-17 ASSESSMENT — ENCOUNTER SYMPTOMS
CHILLS: 1
WHEEZING: 0
VOMITING: 1
COUGH: 1
ABDOMINAL PAIN: 0
HEADACHES: 0
SORE THROAT: 0
NAUSEA: 1
MYALGIAS: 1
FEVER: 1
SHORTNESS OF BREATH: 1
SPUTUM PRODUCTION: 1
DIZZINESS: 0
EYES NEGATIVE: 1
DIARRHEA: 0

## 2022-03-18 NOTE — PROGRESS NOTES
Subjective     Donovan Shrestha is a 36 y.o. female who presents with Illness (Started Saturday night with fever, and cough but now fever has gone away and now has worse cough, and getting harder to breathe and heavy chest )        HPI     Patient presents today with cough and painful breathing. She reports cough  productive of yellowish phlegm that started 4 years ago. This was accompanied by fever as high as 101.9, chills, myalgia, nasal congestion, runny nose, nausea. She denies sore throat, vomiting, diarrhea. Her  was sick as well. She reports chest heaviness that is present even at rest. She also reports painful breathing at times especially after with exertion. She denies asthma or previous cardiac conditions.  Patient has been taking NyQuil at home with some relief.    She tested for COVID at home and was negative. She is covid vaccinated.     PMH:  has no past medical history on file.  MEDS:   Current Outpatient Medications:   •  ibuprofen (MOTRIN) 200 MG Tab, Take 200 mg by mouth every 6 hours as needed. (Patient not taking: Reported on 3/17/2022), Disp: , Rfl:   •  acetaminophen (TYLENOL) 325 MG Tab, Take 650 mg by mouth every four hours as needed. (Patient not taking: Reported on 3/17/2022), Disp: , Rfl:   •  methylPREDNISolone (MEDROL DOSEPAK) 4 MG Tablet Therapy Pack, Follow schedule on package instructions. (Patient not taking: Reported on 3/17/2022), Disp: 21 tablet, Rfl: 0  ALLERGIES:   Allergies   Allergen Reactions   • Amoxicillin Unspecified     Rxn - seizure   • Penicillins Unspecified     Rxn - seizure     SURGHX:   Past Surgical History:   Procedure Laterality Date   • TONSILLECTOMY       SOCHX:  reports that she has been smoking cigarettes. She has a 2.50 pack-year smoking history. She has never used smokeless tobacco. She reports current alcohol use. She reports that she does not use drugs.  FH: Reviewed with patient, not pertinent to this visit.     Review of Systems   Constitutional:  "Positive for chills and fever. Negative for malaise/fatigue.   HENT: Positive for congestion and ear pain (right). Negative for sore throat.    Eyes: Negative.    Respiratory: Positive for cough, sputum production and shortness of breath (intermittent). Negative for wheezing.    Cardiovascular: Positive for chest pain (when coughing and breathing deep).   Gastrointestinal: Positive for nausea and vomiting. Negative for abdominal pain and diarrhea.   Genitourinary: Negative.    Musculoskeletal: Positive for myalgias.   Neurological: Negative for dizziness and headaches.          Objective     /72 (BP Location: Right arm, Patient Position: Sitting, BP Cuff Size: Adult)   Pulse 88   Temp 37.3 °C (99.2 °F) (Temporal)   Resp 12   Ht 1.651 m (5' 5\")   Wt 88.5 kg (195 lb)   SpO2 97%   BMI 32.45 kg/m²      Physical Exam  Constitutional:       Appearance: Normal appearance.   HENT:      Head: Normocephalic and atraumatic.      Mouth/Throat:      Pharynx: No oropharyngeal exudate or posterior oropharyngeal erythema.   Eyes:      Extraocular Movements: Extraocular movements intact.   Cardiovascular:      Rate and Rhythm: Normal rate and regular rhythm.      Pulses: Normal pulses.      Heart sounds: Normal heart sounds.   Pulmonary:      Effort: Pulmonary effort is normal.      Breath sounds: Normal breath sounds.   Musculoskeletal:         General: Normal range of motion.      Cervical back: Normal range of motion.   Lymphadenopathy:      Cervical: No cervical adenopathy.   Skin:     General: Skin is warm and dry.   Neurological:      General: No focal deficit present.      Mental Status: She is alert and oriented to person, place, and time.   Psychiatric:         Mood and Affect: Mood normal.         Behavior: Behavior normal.               RESULTS:    EKG- normal  CXR   IMPRESSION: No active cardiopulmonary abnormalities are identified.    Assessment & Plan     Patient presents today with cough accompanied by " chest tightness and chest heaviness, with intermittent shortness of breath.  Vitals appear stable.  EKG was done due to the presence of chest heaviness.  Chest x-ray was completed due to complaints of shortness of breath and chest tightness.  Both the chest x-ray and EKG were normal. Patient most likely has viral upper respiratory illness. Discussed treatment plan which is mostly supportive, patient is agreeable and verbalized understanding.     Recommended supportive treatment at home:  OTC Tylenol or Motrin for fever/discomfort.  OTC supportive care for nasal congestion - saline nasal spray/Flonase nasal spray and/or netipot  Humidifier and steam inhalation/warm showers.  Increase oral fluid intake.    Return to clinic or go to the ED if symptoms worsen or fail to improve, or if the patient should develop worsening/increasing cough, persistent shortness of breath, wheezing, chest pain, ear pain, sore throat, fever/chills, and/or any concerning symptoms.    Electronically Signed by TANVIR Humphries

## 2022-09-21 ENCOUNTER — HOSPITAL ENCOUNTER (EMERGENCY)
Facility: MEDICAL CENTER | Age: 37
End: 2022-09-22
Attending: EMERGENCY MEDICINE
Payer: COMMERCIAL

## 2022-09-21 DIAGNOSIS — R10.9 FLANK PAIN: ICD-10-CM

## 2022-09-21 DIAGNOSIS — N39.0 ACUTE UTI: ICD-10-CM

## 2022-09-21 DIAGNOSIS — R11.2 NON-INTRACTABLE VOMITING WITH NAUSEA, UNSPECIFIED VOMITING TYPE: ICD-10-CM

## 2022-09-21 DIAGNOSIS — N17.9 AKI (ACUTE KIDNEY INJURY) (HCC): ICD-10-CM

## 2022-09-21 DIAGNOSIS — E86.0 DEHYDRATION: ICD-10-CM

## 2022-09-21 LAB
ALBUMIN SERPL BCP-MCNC: 4.3 G/DL (ref 3.2–4.9)
ALBUMIN/GLOB SERPL: 1.7 G/DL
ALP SERPL-CCNC: 79 U/L (ref 30–99)
ALT SERPL-CCNC: 19 U/L (ref 2–50)
ANION GAP SERPL CALC-SCNC: 15 MMOL/L (ref 7–16)
AST SERPL-CCNC: 19 U/L (ref 12–45)
BASOPHILS # BLD AUTO: 0.5 % (ref 0–1.8)
BASOPHILS # BLD: 0.05 K/UL (ref 0–0.12)
BILIRUB SERPL-MCNC: 0.7 MG/DL (ref 0.1–1.5)
BUN SERPL-MCNC: 15 MG/DL (ref 8–22)
CALCIUM SERPL-MCNC: 9.1 MG/DL (ref 8.4–10.2)
CHLORIDE SERPL-SCNC: 106 MMOL/L (ref 96–112)
CO2 SERPL-SCNC: 19 MMOL/L (ref 20–33)
CREAT SERPL-MCNC: 1.85 MG/DL (ref 0.5–1.4)
EOSINOPHIL # BLD AUTO: 0.18 K/UL (ref 0–0.51)
EOSINOPHIL NFR BLD: 1.9 % (ref 0–6.9)
ERYTHROCYTE [DISTWIDTH] IN BLOOD BY AUTOMATED COUNT: 42.9 FL (ref 35.9–50)
GFR SERPLBLD CREATININE-BSD FMLA CKD-EPI: 36 ML/MIN/1.73 M 2
GLOBULIN SER CALC-MCNC: 2.5 G/DL (ref 1.9–3.5)
GLUCOSE SERPL-MCNC: 106 MG/DL (ref 65–99)
HCG SERPL QL: NEGATIVE
HCT VFR BLD AUTO: 43.5 % (ref 37–47)
HGB BLD-MCNC: 15.2 G/DL (ref 12–16)
IMM GRANULOCYTES # BLD AUTO: 0.04 K/UL (ref 0–0.11)
IMM GRANULOCYTES NFR BLD AUTO: 0.4 % (ref 0–0.9)
LIPASE SERPL-CCNC: 19 U/L (ref 7–58)
LYMPHOCYTES # BLD AUTO: 2.18 K/UL (ref 1–4.8)
LYMPHOCYTES NFR BLD: 23.3 % (ref 22–41)
MCH RBC QN AUTO: 32.4 PG (ref 27–33)
MCHC RBC AUTO-ENTMCNC: 34.9 G/DL (ref 33.6–35)
MCV RBC AUTO: 92.8 FL (ref 81.4–97.8)
MONOCYTES # BLD AUTO: 0.79 K/UL (ref 0–0.85)
MONOCYTES NFR BLD AUTO: 8.5 % (ref 0–13.4)
NEUTROPHILS # BLD AUTO: 6.1 K/UL (ref 2–7.15)
NEUTROPHILS NFR BLD: 65.4 % (ref 44–72)
NRBC # BLD AUTO: 0 K/UL
NRBC BLD-RTO: 0 /100 WBC
PLATELET # BLD AUTO: 266 K/UL (ref 164–446)
PMV BLD AUTO: 9.6 FL (ref 9–12.9)
POTASSIUM SERPL-SCNC: 3.3 MMOL/L (ref 3.6–5.5)
PROT SERPL-MCNC: 6.8 G/DL (ref 6–8.2)
RBC # BLD AUTO: 4.69 M/UL (ref 4.2–5.4)
SODIUM SERPL-SCNC: 140 MMOL/L (ref 135–145)
WBC # BLD AUTO: 9.3 K/UL (ref 4.8–10.8)

## 2022-09-21 PROCEDURE — 80053 COMPREHEN METABOLIC PANEL: CPT

## 2022-09-21 PROCEDURE — 99284 EMERGENCY DEPT VISIT MOD MDM: CPT

## 2022-09-21 PROCEDURE — 84703 CHORIONIC GONADOTROPIN ASSAY: CPT

## 2022-09-21 PROCEDURE — 96374 THER/PROPH/DIAG INJ IV PUSH: CPT

## 2022-09-21 PROCEDURE — 700111 HCHG RX REV CODE 636 W/ 250 OVERRIDE (IP): Performed by: EMERGENCY MEDICINE

## 2022-09-21 PROCEDURE — 85025 COMPLETE CBC W/AUTO DIFF WBC: CPT

## 2022-09-21 PROCEDURE — 83690 ASSAY OF LIPASE: CPT

## 2022-09-21 PROCEDURE — 36415 COLL VENOUS BLD VENIPUNCTURE: CPT

## 2022-09-21 PROCEDURE — 700105 HCHG RX REV CODE 258: Performed by: EMERGENCY MEDICINE

## 2022-09-21 RX ORDER — OMEPRAZOLE 20 MG/1
20 CAPSULE, DELAYED RELEASE ORAL ONCE
Status: COMPLETED | OUTPATIENT
Start: 2022-09-21 | End: 2022-09-22

## 2022-09-21 RX ORDER — SODIUM CHLORIDE, SODIUM LACTATE, POTASSIUM CHLORIDE, CALCIUM CHLORIDE 600; 310; 30; 20 MG/100ML; MG/100ML; MG/100ML; MG/100ML
1000 INJECTION, SOLUTION INTRAVENOUS ONCE
Status: COMPLETED | OUTPATIENT
Start: 2022-09-22 | End: 2022-09-22

## 2022-09-21 RX ORDER — SODIUM CHLORIDE 9 MG/ML
1000 INJECTION, SOLUTION INTRAVENOUS ONCE
Status: COMPLETED | OUTPATIENT
Start: 2022-09-21 | End: 2022-09-22

## 2022-09-21 RX ORDER — ONDANSETRON 2 MG/ML
4 INJECTION INTRAMUSCULAR; INTRAVENOUS ONCE
Status: COMPLETED | OUTPATIENT
Start: 2022-09-21 | End: 2022-09-21

## 2022-09-21 RX ADMIN — SODIUM CHLORIDE 1000 ML: 9 INJECTION, SOLUTION INTRAVENOUS at 23:07

## 2022-09-21 RX ADMIN — ONDANSETRON 4 MG: 2 INJECTION INTRAMUSCULAR; INTRAVENOUS at 23:07

## 2022-09-22 ENCOUNTER — APPOINTMENT (OUTPATIENT)
Dept: RADIOLOGY | Facility: MEDICAL CENTER | Age: 37
End: 2022-09-22
Attending: EMERGENCY MEDICINE
Payer: COMMERCIAL

## 2022-09-22 VITALS
DIASTOLIC BLOOD PRESSURE: 82 MMHG | WEIGHT: 184.75 LBS | HEART RATE: 65 BPM | RESPIRATION RATE: 18 BRPM | HEIGHT: 65 IN | SYSTOLIC BLOOD PRESSURE: 148 MMHG | TEMPERATURE: 98 F | OXYGEN SATURATION: 97 % | BODY MASS INDEX: 30.78 KG/M2

## 2022-09-22 LAB
APPEARANCE UR: ABNORMAL
BACTERIA #/AREA URNS HPF: ABNORMAL /HPF
BILIRUB UR QL STRIP.AUTO: NEGATIVE
COLOR UR: YELLOW
EPI CELLS #/AREA URNS HPF: ABNORMAL /HPF
GLUCOSE UR STRIP.AUTO-MCNC: NEGATIVE MG/DL
KETONES UR STRIP.AUTO-MCNC: ABNORMAL MG/DL
LEUKOCYTE ESTERASE UR QL STRIP.AUTO: ABNORMAL
MICRO URNS: ABNORMAL
NITRITE UR QL STRIP.AUTO: POSITIVE
PH UR STRIP.AUTO: 6 [PH] (ref 5–8)
PROT UR QL STRIP: NEGATIVE MG/DL
RBC # URNS HPF: ABNORMAL /HPF
RBC UR QL AUTO: ABNORMAL
SP GR UR STRIP.AUTO: 1.01
WBC #/AREA URNS HPF: ABNORMAL /HPF

## 2022-09-22 PROCEDURE — 700105 HCHG RX REV CODE 258: Performed by: EMERGENCY MEDICINE

## 2022-09-22 PROCEDURE — 96375 TX/PRO/DX INJ NEW DRUG ADDON: CPT

## 2022-09-22 PROCEDURE — 700102 HCHG RX REV CODE 250 W/ 637 OVERRIDE(OP): Performed by: EMERGENCY MEDICINE

## 2022-09-22 PROCEDURE — 74176 CT ABD & PELVIS W/O CONTRAST: CPT

## 2022-09-22 PROCEDURE — A9270 NON-COVERED ITEM OR SERVICE: HCPCS | Performed by: EMERGENCY MEDICINE

## 2022-09-22 PROCEDURE — 700111 HCHG RX REV CODE 636 W/ 250 OVERRIDE (IP): Performed by: EMERGENCY MEDICINE

## 2022-09-22 PROCEDURE — 81001 URINALYSIS AUTO W/SCOPE: CPT

## 2022-09-22 RX ORDER — CEFTRIAXONE 2 G/1
2 INJECTION, POWDER, FOR SOLUTION INTRAMUSCULAR; INTRAVENOUS ONCE
Status: COMPLETED | OUTPATIENT
Start: 2022-09-22 | End: 2022-09-22

## 2022-09-22 RX ORDER — PROCHLORPERAZINE MALEATE 10 MG
10 TABLET ORAL EVERY 6 HOURS PRN
Qty: 30 TABLET | Refills: 0 | Status: SHIPPED | OUTPATIENT
Start: 2022-09-22 | End: 2024-01-09

## 2022-09-22 RX ORDER — ONDANSETRON 4 MG/1
4 TABLET, ORALLY DISINTEGRATING ORAL EVERY 8 HOURS PRN
Qty: 10 TABLET | Refills: 0 | Status: SHIPPED | OUTPATIENT
Start: 2022-09-22 | End: 2024-01-09

## 2022-09-22 RX ORDER — CEFDINIR 300 MG/1
300 CAPSULE ORAL 2 TIMES DAILY
Qty: 10 CAPSULE | Refills: 0 | Status: SHIPPED | OUTPATIENT
Start: 2022-09-22 | End: 2022-09-27

## 2022-09-22 RX ADMIN — OMEPRAZOLE 20 MG: 20 CAPSULE, DELAYED RELEASE ORAL at 00:11

## 2022-09-22 RX ADMIN — CEFTRIAXONE SODIUM 2 G: 2 INJECTION, POWDER, FOR SOLUTION INTRAMUSCULAR; INTRAVENOUS at 02:18

## 2022-09-22 RX ADMIN — SODIUM CHLORIDE, POTASSIUM CHLORIDE, SODIUM LACTATE AND CALCIUM CHLORIDE 1000 ML: 600; 310; 30; 20 INJECTION, SOLUTION INTRAVENOUS at 00:11

## 2022-09-22 NOTE — ED TRIAGE NOTES
"Chief Complaint   Patient presents with    N/V     Emesis x1 week, gradually getting worse, blood present in vomit.  Right lower quadrant pain.  Unable to eat x3 days.  Able to keep some fluids down.  No diarrhea, no constipation, no fevers.       BP (!) 151/108   Pulse 76   Temp 36.4 °C (97.6 °F) (Temporal)   Resp 18   Ht 1.651 m (5' 5\")   Wt 83.8 kg (184 lb 11.9 oz)   LMP 09/21/2022   SpO2 97%   BMI 30.74 kg/m²     Missing covid booster.  "

## 2022-09-22 NOTE — DISCHARGE INSTRUCTIONS
Drink extra fluids for the next couple days.  Take all your antibiotics until gone.  Return the emergency department if you have more vomiting, unable to keep any fluids down, back pain or fever that will not go down with Tylenol or ibuprofen.,

## 2022-09-22 NOTE — ED PROVIDER NOTES
ED Provider Note    Scribed for Nehemias Ricks M.D. by Nick Watson. 9/21/2022, 10:50 PM.    Primary care provider: Jennifer Orlando P.A.-C. (Inactive)  Means of arrival: Walk-In  History obtained from: Patient  History limited by: None    CHIEF COMPLAINT  Chief Complaint   Patient presents with    N/V     Emesis x1 week, gradually getting worse, blood present in vomit.  Right lower quadrant pain.  Unable to eat x3 days.  Able to keep some fluids down.  No diarrhea, no constipation, no fevers.         HPI  Donovan Shrestha is a 36 y.o. female who presents to the Emergency Department vomiting onset 1 week ago. The patient reports streaks of blood, non-stop vomiting for 3 days. She reports intermittent pain on her side. She denies shaking, chills, pain when urination, blood in urine, diarrhea, fever, cough, blood in stool, or dark black stool. She denies any previous abdominal surgeries or pregnancy. She admits to marijuana use, but denies using any for a couple weeks. She reports previous diagnosis of kidney stones. She is allergic to amoxicillin and penicillins.    REVIEW OF SYSTEMS  Pertinent positives include vomiting, hematemesis, and intermittent side. Pertinent negatives include shaking, chills, pain when urination, blood in urine, diarrhea, fever, cough, blood in stool, or dark black stool. All other systems negative.    PAST MEDICAL HISTORY   has a past medical history of Patient denies medical problems.    SURGICAL HISTORY   has a past surgical history that includes tonsillectomy.    SOCIAL HISTORY  Social History     Tobacco Use    Smoking status: Every Day     Packs/day: 0.25     Years: 10.00     Pack years: 2.50     Types: Cigarettes    Smokeless tobacco: Never    Tobacco comments:     currently smoking about 2-3 cigs/day   Vaping Use    Vaping Use: Never used   Substance Use Topics    Alcohol use: Yes     Comment: occasional     Drug use: No      Social History     Substance and Sexual Activity   Drug  "Use No       FAMILY HISTORY  Family History   Problem Relation Age of Onset    Cancer Father     Prostate cancer Father     Hypertension Brother     Hypertension Paternal Aunt     Hypertension Paternal Uncle     Cancer Maternal Grandmother     Breast Cancer Maternal Grandmother     Cancer Maternal Grandfather         colon cancer    Cancer Paternal Grandmother         breast cancer    Hypertension Paternal Grandmother     Breast Cancer Paternal Grandmother     Hypertension Paternal Grandfather        CURRENT MEDICATIONS  Home Medications       Reviewed by Yaima Higgins R.N. (Registered Nurse) on 09/21/22 at 2234  Med List Status: Not Addressed     Medication Last Dose Status   acetaminophen (TYLENOL) 325 MG Tab  Active   ibuprofen (MOTRIN) 200 MG Tab  Active   methylPREDNISolone (MEDROL DOSEPAK) 4 MG Tablet Therapy Pack  Active                    ALLERGIES  Allergies   Allergen Reactions    Amoxicillin Unspecified     Rxn - seizure    Penicillins Unspecified     Rxn - seizure       PHYSICAL EXAM  VITAL SIGNS: BP (!) 151/108   Pulse 76   Temp 36.4 °C (97.6 °F) (Temporal)   Resp 18   Ht 1.651 m (5' 5\")   Wt 83.8 kg (184 lb 11.9 oz)   LMP 09/21/2022   SpO2 97%   BMI 30.74 kg/m²     Constitutional: Well developed, Well nourished, mild distress.   HENT: Normocephalic, Atraumatic, mask in place.  Eyes: Conjunctiva normal, No discharge.   Neck: Supple, No stridor   Cardiovascular: Normal heart rate, Normal rhythm, No murmurs, equal pulses.   Pulmonary: Normal breath sounds, No respiratory distress, No wheezing, No rales, No rhonchi.  Abdomen: Soft, No tenderness, No masses, no rebound, no guarding. Negative Hines's sign. No McBurney's point tenderness.  Back: No CVA tenderness.   Musculoskeletal: No major deformities noted, No tenderness.   Skin: Warm, Dry, No erythema, No rash.   Neurologic: Alert & oriented x 3, Normal motor function,  No focal deficits noted.   Psychiatric: Affect normal, Judgment normal, " Mood normal.     LABS  Results for orders placed or performed during the hospital encounter of 09/21/22   CBC WITH DIFFERENTIAL   Result Value Ref Range    WBC 9.3 4.8 - 10.8 K/uL    RBC 4.69 4.20 - 5.40 M/uL    Hemoglobin 15.2 12.0 - 16.0 g/dL    Hematocrit 43.5 37.0 - 47.0 %    MCV 92.8 81.4 - 97.8 fL    MCH 32.4 27.0 - 33.0 pg    MCHC 34.9 33.6 - 35.0 g/dL    RDW 42.9 35.9 - 50.0 fL    Platelet Count 266 164 - 446 K/uL    MPV 9.6 9.0 - 12.9 fL    Neutrophils-Polys 65.40 44.00 - 72.00 %    Lymphocytes 23.30 22.00 - 41.00 %    Monocytes 8.50 0.00 - 13.40 %    Eosinophils 1.90 0.00 - 6.90 %    Basophils 0.50 0.00 - 1.80 %    Immature Granulocytes 0.40 0.00 - 0.90 %    Nucleated RBC 0.00 /100 WBC    Neutrophils (Absolute) 6.10 2.00 - 7.15 K/uL    Lymphs (Absolute) 2.18 1.00 - 4.80 K/uL    Monos (Absolute) 0.79 0.00 - 0.85 K/uL    Eos (Absolute) 0.18 0.00 - 0.51 K/uL    Baso (Absolute) 0.05 0.00 - 0.12 K/uL    Immature Granulocytes (abs) 0.04 0.00 - 0.11 K/uL    NRBC (Absolute) 0.00 K/uL   COMP METABOLIC PANEL   Result Value Ref Range    Sodium 140 135 - 145 mmol/L    Potassium 3.3 (L) 3.6 - 5.5 mmol/L    Chloride 106 96 - 112 mmol/L    Co2 19 (L) 20 - 33 mmol/L    Anion Gap 15.0 7.0 - 16.0    Glucose 106 (H) 65 - 99 mg/dL    Bun 15 8 - 22 mg/dL    Creatinine 1.85 (H) 0.50 - 1.40 mg/dL    Calcium 9.1 8.4 - 10.2 mg/dL    AST(SGOT) 19 12 - 45 U/L    ALT(SGPT) 19 2 - 50 U/L    Alkaline Phosphatase 79 30 - 99 U/L    Total Bilirubin 0.7 0.1 - 1.5 mg/dL    Albumin 4.3 3.2 - 4.9 g/dL    Total Protein 6.8 6.0 - 8.2 g/dL    Globulin 2.5 1.9 - 3.5 g/dL    A-G Ratio 1.7 g/dL   LIPASE   Result Value Ref Range    Lipase 19 7 - 58 U/L   URINALYSIS (UA)    Specimen: Urine   Result Value Ref Range    Color Yellow     Character Hazy (A)     Specific Gravity 1.015 <1.035    Ph 6.0 5.0 - 8.0    Glucose Negative Negative mg/dL    Ketones Trace (A) Negative mg/dL    Protein Negative Negative mg/dL    Bilirubin Negative Negative    Nitrite  Positive (A) Negative    Leukocyte Esterase Small (A) Negative    Occult Blood Large (A) Negative    Micro Urine Req Microscopic    HCG QUAL SERUM   Result Value Ref Range    Beta-Hcg Qualitative Serum Negative Negative   ESTIMATED GFR   Result Value Ref Range    GFR (CKD-EPI) 36 (A) >60 mL/min/1.73 m 2   URINE MICROSCOPIC (W/UA)   Result Value Ref Range    WBC 5-10 (A) /hpf    RBC 10-20 (A) /hpf    Bacteria Many (A) None /hpf    Epithelial Cells Few Few /hpf        All labs reviewed by me.  Imaging  CT-RENAL COLIC EVALUATION(A/P W/O)   Final Result      1.  Bilateral nephrolithiasis which is nonobstructing..      2.  Tiny phlebolith right hemipelvis unchanged since previous exam.      3.  Normal appendix located laterally in the right hemiabdomen.            COURSE & MEDICAL DECISION MAKING  Pertinent Labs & Imaging studies reviewed. (See chart for details)    10:50 PM - Patient seen and examined at bedside. Patient will be treated with Zofran 4mg and Prilosec 10mg PO. The patient will receive IV fluids for vomiting. Ordered CBC with differentials, Complete metabolic panel, Urine Analysis,  HCG qual serum, and Lipase to evaluate her symptoms. The differential diagnoses include but are not limited to: Gastroenteritis, electrolyte abnormality, dehydration, brenden mcneal tear, gastritis      There was a positive response to IV fluids after patient reevaluation.    Patient was given 2 L IV fluids.  Patient did complain of worsening flank pain and told me she had a history of kidney stone with her urinary tract infection I was concerned about possible infected kidney stone therefore CT abdomen pelvis was done.  This does not show any ureteral stones.  Patient's vomiting is now controlled.  Discussed with her taking antibiotics until gone.    Medical Decision Making: Patient presents with 1 week of nausea and vomiting.  At this point time it appears she is fairly dehydrated and this is caused an GWYN.  Patient has been  given 2 L of fluids here.  We will treat her with a dose of Rocephin and started on Omnicef for that urinary tract infection.  Patient's vomiting's been controlled with Zofran at this point time she would like to try to go home.  We will send her home with Zofran and Compazine to help with her vomiting.  Discussed increasing her fluid intake from neck several days to help with her GWYN.     The patient will return for new or worsening symptoms and is stable at the time of discharge.    The patient is referred to a primary physician for blood pressure management, diabetic screening, and for all other preventative health concerns.        DISPOSITION:  Patient will be discharged home in stable condition.    FOLLOW UP:  Your doctor      You can call [phone] to get a doctor    [facility]  [address]  [facility] [address] [phone]    If you need a doctor    [facility]  [address]  [facility] [address]    If you need a doctor      OUTPATIENT MEDICATIONS:  New Prescriptions    CEFDINIR (OMNICEF) 300 MG CAP    Take 1 Capsule by mouth 2 times a day for 5 days.    ONDANSETRON (ZOFRAN ODT) 4 MG TABLET DISPERSIBLE    Take 1 Tablet by mouth every 8 hours as needed for Nausea.    PROCHLORPERAZINE (COMPAZINE) 10 MG TAB    Take 1 Tablet by mouth every 6 hours as needed for Nausea/Vomiting.          FINAL IMPRESSION  1. Non-intractable vomiting with nausea, unspecified vomiting type    2. Acute UTI    3. Flank pain    4. GWYN (acute kidney injury) (HCC)    5. Dehydration          Nick GUERRERO (Miguelibjohnathan), am scribing for, and in the presence of, Nehemias Ricks M.D.    Electronically signed by: Nick Watson (Kathleen), 9/21/2022    Nehemias GUERRERO M.D. personally performed the services described in this documentation, as scribed by Nick Watson in my presence, and it is both accurate and complete.    The note accurately reflects work and decisions made by me.  Nehemias STANLEY  SAVANNAH Ricks  9/22/2022  1:44 AM

## 2022-09-22 NOTE — ED NOTES
Patient ambulatory without problem with steady gait. VSS. Reports decrease in GI symptoms. Given ginger ale and crackers prior to d/c. No adverse reaction noted to antibiotic. D/c'd to home as ordered.

## 2024-01-09 ENCOUNTER — OFFICE VISIT (OUTPATIENT)
Dept: URGENT CARE | Facility: PHYSICIAN GROUP | Age: 39
End: 2024-01-09
Payer: COMMERCIAL

## 2024-01-09 ENCOUNTER — APPOINTMENT (OUTPATIENT)
Dept: RADIOLOGY | Facility: IMAGING CENTER | Age: 39
End: 2024-01-09
Attending: FAMILY MEDICINE
Payer: COMMERCIAL

## 2024-01-09 VITALS
DIASTOLIC BLOOD PRESSURE: 90 MMHG | TEMPERATURE: 98.5 F | OXYGEN SATURATION: 94 % | HEIGHT: 65 IN | BODY MASS INDEX: 33.82 KG/M2 | WEIGHT: 203 LBS | HEART RATE: 78 BPM | RESPIRATION RATE: 16 BRPM | SYSTOLIC BLOOD PRESSURE: 130 MMHG
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
## 2024-01-09 DIAGNOSIS — M25.512 ACUTE PAIN OF LEFT SHOULDER: ICD-10-CM

## 2024-01-09 PROCEDURE — 99213 OFFICE O/P EST LOW 20 MIN: CPT | Performed by: FAMILY MEDICINE

## 2024-01-09 PROCEDURE — 73030 X-RAY EXAM OF SHOULDER: CPT | Mod: TC,LT | Performed by: FAMILY MEDICINE

## 2024-01-09 PROCEDURE — 3080F DIAST BP >= 90 MM HG: CPT | Performed by: FAMILY MEDICINE

## 2024-01-09 PROCEDURE — 3075F SYST BP GE 130 - 139MM HG: CPT | Performed by: FAMILY MEDICINE

## 2024-01-09 ASSESSMENT — FIBROSIS 4 INDEX: FIB4 SCORE: 0.59
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
## 2024-01-09 NOTE — LETTER
January 9, 2024    To Whom It May Concern:         This is confirmation that Donovan Shrestha attended her scheduled appointment with Geri Jane M.D. on 1/09/24. She may return to work 1/15/2024 without any restrictions.          If you have any questions please do not hesitate to call me at the phone number listed below.    Sincerely,          Geri Jane M.D.  [phone]

## 2025-05-15 ENCOUNTER — OFFICE VISIT (OUTPATIENT)
Dept: URGENT CARE | Facility: PHYSICIAN GROUP | Age: 40
End: 2025-05-15
Payer: COMMERCIAL

## 2025-05-15 VITALS
BODY MASS INDEX: 31.65 KG/M2 | HEART RATE: 85 BPM | HEIGHT: 65 IN | TEMPERATURE: 99.5 F | DIASTOLIC BLOOD PRESSURE: 102 MMHG | OXYGEN SATURATION: 96 % | RESPIRATION RATE: 16 BRPM | SYSTOLIC BLOOD PRESSURE: 146 MMHG | WEIGHT: 190 LBS

## 2025-05-15 DIAGNOSIS — R03.0 ELEVATED BLOOD PRESSURE READING: Primary | ICD-10-CM

## 2025-05-15 DIAGNOSIS — H66.001 NON-RECURRENT ACUTE SUPPURATIVE OTITIS MEDIA OF RIGHT EAR WITHOUT SPONTANEOUS RUPTURE OF TYMPANIC MEMBRANE: ICD-10-CM

## 2025-05-15 PROCEDURE — 99214 OFFICE O/P EST MOD 30 MIN: CPT

## 2025-05-15 PROCEDURE — 3080F DIAST BP >= 90 MM HG: CPT

## 2025-05-15 PROCEDURE — 3077F SYST BP >= 140 MM HG: CPT

## 2025-05-15 RX ORDER — DOXYCYCLINE HYCLATE 100 MG
100 TABLET ORAL 2 TIMES DAILY
Qty: 14 TABLET | Refills: 0 | Status: SHIPPED | OUTPATIENT
Start: 2025-05-15 | End: 2025-05-22

## 2025-05-15 ASSESSMENT — ENCOUNTER SYMPTOMS
DIZZINESS: 1
NAUSEA: 1
FEVER: 0
COUGH: 0